# Patient Record
Sex: FEMALE | Race: WHITE | Employment: OTHER | URBAN - METROPOLITAN AREA
[De-identification: names, ages, dates, MRNs, and addresses within clinical notes are randomized per-mention and may not be internally consistent; named-entity substitution may affect disease eponyms.]

---

## 2017-09-15 ENCOUNTER — OFFICE VISIT (OUTPATIENT)
Dept: URGENT CARE | Facility: URGENT CARE | Age: 72
End: 2017-09-15
Payer: COMMERCIAL

## 2017-09-15 ENCOUNTER — HOSPITAL ENCOUNTER (OUTPATIENT)
Dept: CT IMAGING | Facility: CLINIC | Age: 72
Discharge: HOME | End: 2017-09-15
Attending: PHYSICIAN ASSISTANT | Admitting: PHYSICIAN ASSISTANT
Payer: MEDICARE

## 2017-09-15 VITALS — SYSTOLIC BLOOD PRESSURE: 156 MMHG | DIASTOLIC BLOOD PRESSURE: 84 MMHG | HEART RATE: 72 BPM | OXYGEN SATURATION: 94 %

## 2017-09-15 DIAGNOSIS — S09.90XA CLOSED HEAD INJURY, INITIAL ENCOUNTER: ICD-10-CM

## 2017-09-15 DIAGNOSIS — S00.03XA SCALP HEMATOMA, INITIAL ENCOUNTER: Primary | ICD-10-CM

## 2017-09-15 DIAGNOSIS — S60.312A: ICD-10-CM

## 2017-09-15 DIAGNOSIS — S00.03XA SCALP HEMATOMA, INITIAL ENCOUNTER: ICD-10-CM

## 2017-09-15 PROCEDURE — 70450 CT HEAD/BRAIN W/O DYE: CPT

## 2017-09-15 PROCEDURE — 99203 OFFICE O/P NEW LOW 30 MIN: CPT | Performed by: PHYSICIAN ASSISTANT

## 2017-09-15 RX ORDER — LEVOTHYROXINE SODIUM 100 UG/1
100 TABLET ORAL DAILY
COMMUNITY
End: 2020-11-10

## 2017-09-15 RX ORDER — SERTRALINE HYDROCHLORIDE 100 MG/1
200 TABLET, FILM COATED ORAL DAILY
COMMUNITY

## 2017-09-15 RX ORDER — BUPROPION HYDROCHLORIDE 150 MG/1
450 TABLET, EXTENDED RELEASE ORAL DAILY
COMMUNITY
End: 2020-11-10

## 2017-09-15 NOTE — NURSING NOTE
Chief Complaint   Patient presents with     Head Injury     fell and hit head on concrete aprox 1 hr ago,has swelling on scalp,pt denies LOC,denies headache or nausea       Initial /84 (BP Location: Right arm, Patient Position: Chair, Cuff Size: Adult Large)  Pulse 72  SpO2 94% There is no height or weight on file to calculate BMI.  Medication Reconciliation: complete Matti REYES

## 2017-09-15 NOTE — MR AVS SNAPSHOT
"              After Visit Summary   9/15/2017    Fern Simons    MRN: [id number]           Patient Information     Date Of Birth          1945        Visit Information        Provider Department      9/15/2017 3:15 PM Rolando James PA-C [facility]        Today's Diagnoses     Scalp hematoma, initial encounter    -  1    Closed head injury, initial encounter        Abrasion of thumb, left, initial encounter           Follow-ups after your visit        Who to contact     If you have questions or need follow up information about today's clinic visit or your schedule please contact [facility] directly at [phone].  Normal or non-critical lab and imaging results will be communicated to you by MyChart, letter or phone within 4 business days after the clinic has received the results. If you do not hear from us within 7 days, please contact the clinic through "[employer]hart or phone. If you have a critical or abnormal lab result, we will notify you by phone as soon as possible.  Submit refill requests through [employer] or call your pharmacy and they will forward the refill request to us. Please allow 3 business days for your refill to be completed.          Additional Information About Your Visit        MyChart Information     [employer] lets you send messages to your doctor, view your test results, renew your prescriptions, schedule appointments and more. To sign up, go to [email or web address] . Click on \"Log in\" on the left side of the screen, which will take you to the Welcome page. Then click on \"Sign up Now\" on the right side of the page.     You will be asked to enter the access code listed below, as well as some personal information. Please follow the directions to create your username and password.     Your access code is: [email or web address]  Expires: 2017 11:39 AM     Your access code will  in 90 days. If you need help or a new code, please " call your [facility] clinic or [phone].        Care EveryWhere ID     This is your Care EveryWhere ID. This could be used by other organizations to access your [facility] medical records  [id number]        Your Vitals Were     Pulse Pulse Oximetry                72 94%           Blood Pressure from Last 3 Encounters:   09/15/17 156/84    Weight from Last 3 Encounters:   No data found for Wt               Primary Care Provider    None Specified       No primary provider on file.        Equal Access to Services     ROBERTO GUSTAFSON : Hadii aad ku hadasho Soomaali, waaxda luqadaha, qaybta kaalmada adeegyada, celso aquino oneln genemayda osullivananahi zacarias . So [facility] [phone].    ATENCIÓN: Si habla español, tiene a miles disposición servicios gratuitos de asistencia lingüística. Llame al [phone].    We comply with applicable federal civil rights laws and [place] laws. We do not discriminate on the basis of race, color, national origin, age, disability sex, sexual orientation or gender identity.            Thank you!     Thank you for choosing [facility]  for your care. Our goal is always to provide you with excellent care. Hearing back from our patients is one way we can continue to improve our services. Please take a few minutes to complete the written survey that you may receive in the mail after your visit with us. Thank you!             Your Updated Medication List - Protect others around you: Learn how to safely use, store and throw away your medicines at www.disposemymeds.org.          This list is accurate as of: 9/15/17 11:59 PM.  Always use your most recent med list.                   Brand Name Dispense Instructions for use Diagnosis    SYNTHROID 100 MCG tablet   Generic drug:  levothyroxine      Take 100 mcg by mouth daily        WELLBUTRIN  MG 12 hr tablet   Generic drug:  buPROPion      Take 450 mg by mouth daily        ZOLOFT 100 MG tablet   Generic drug:  sertraline       Take 200 mg by mouth daily

## 2017-09-18 NOTE — PROGRESS NOTES
SUBJECTIVE:   Fern Simons is a 72 year old female presenting with a chief complaint of having a head injury, fall backwards and hit her head.  Onset of symptoms was today .  Course of illness is same.    Severity moderate  Current and Associated symptoms: head and scalp tenderness  Treatment measures tried include none.  Predisposing factors include .    No past medical history on file.     Allergies   Allergen Reactions     Ciprofloxacin      Sulfa Drugs          Social History   Substance Use Topics     Smoking status: Former Smoker     Quit date: 9/15/1980     Smokeless tobacco: Never Used     Alcohol use Not on file       ROS:  CONSTITUTIONAL:NEGATIVE for fever, chills, change in weight  INTEGUMENTARY/SKIN: POSITIVE for scalp tenderness posterior head  ENT/MOUTH: NEGATIVE for ear, mouth and throat problems  RESP:NEGATIVE for significant cough or SOB  CV: NEGATIVE for chest pain, palpitations or peripheral edema  GI: NEGATIVE for nausea, abdominal pain, heartburn, or change in bowel habits  MUSCULOSKELETAL: NEGATIVE for significant arthralgias or myalgia  NEURO: NEGATIVE for weakness, dizziness or paresthesias    OBJECTIVE  :/84 (BP Location: Right arm, Patient Position: Chair, Cuff Size: Adult Large)  Pulse 72  SpO2 94%  GENERAL APPEARANCE: healthy, alert and no distress  EYES: EOMI,  PERRL, conjunctiva clear  HENT: ear canals and TM's normal.  Nose and mouth without ulcers, erythema or lesions  NECK: supple, nontender, no lymphadenopathy  RESP: lungs clear to auscultation - no rales, rhonchi or wheezes  CV: regular rates and rhythm, normal S1 S2, no murmur noted  ABDOMEN:  soft, nontender, no HSM or masses and bowel sounds normal  NEURO: Normal strength and tone, sensory exam grossly normal,  normal speech and mentation  SKIN:Positive for mild swelling posterior scalp    CT OF THE HEAD WITHOUT CONTRAST  9/15/2017  6:00 PM      COMPARISON: None.     HISTORY: Head injury, scalp hematoma. Unspecified  injury of head,  initial encounter. Contusion of scalp, initial encounter.     TECHNIQUE: 5 mm thick axial CT images of the head were acquired  without IV contrast material.     FINDINGS: There is mild diffuse cerebral volume loss. There are subtle  patchy areas of decreased density in the cerebral white matter  bilaterally that are consistent with sequela of chronic small vessel  ischemic disease.     The ventricles and basal cisterns are within normal limits in  configuration given the degree of cerebral volume loss. There is no  midline shift. There are no extra-axial fluid collections.     No intracranial hemorrhage, mass or recent infarct.     The visualized paranasal sinuses are well-aerated. There is no  mastoiditis. There are no fractures of the visualized bones.         IMPRESSION: Diffuse cerebral volume loss and cerebral white matter  changes consistent with chronic small vessel ischemic disease. No  evidence for acute intracranial pathology.         ASSESSMENT/PLAN:      ICD-10-CM    1. Scalp hematoma, initial encounter S00.03XA CT Head w/o Contrast   2. Closed head injury, initial encounter S09.90XA CT Head w/o Contrast   3. Abrasion of thumb, left, initial encounter S60.312A        Cold packs, tylenol  If any head injury symptoms occur then go to the ED

## 2018-05-01 ENCOUNTER — APPOINTMENT (OUTPATIENT)
Dept: CT IMAGING | Facility: CLINIC | Age: 73
End: 2018-05-01
Attending: NURSE PRACTITIONER
Payer: MEDICARE

## 2018-05-01 ENCOUNTER — APPOINTMENT (OUTPATIENT)
Dept: GENERAL RADIOLOGY | Facility: CLINIC | Age: 73
End: 2018-05-01
Attending: NURSE PRACTITIONER
Payer: MEDICARE

## 2018-05-01 ENCOUNTER — HOSPITAL ENCOUNTER (EMERGENCY)
Facility: CLINIC | Age: 73
Discharge: HOME | End: 2018-05-01
Attending: NURSE PRACTITIONER | Admitting: NURSE PRACTITIONER
Payer: MEDICARE

## 2018-05-01 VITALS
DIASTOLIC BLOOD PRESSURE: 87 MMHG | RESPIRATION RATE: 18 BRPM | HEART RATE: 68 BPM | SYSTOLIC BLOOD PRESSURE: 168 MMHG | BODY MASS INDEX: 37.76 KG/M2 | HEIGHT: 61 IN | OXYGEN SATURATION: 96 % | TEMPERATURE: 98 F | WEIGHT: 200 LBS
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
## 2018-05-01 DIAGNOSIS — M25.552 HIP PAIN, LEFT: ICD-10-CM

## 2018-05-01 DIAGNOSIS — S09.90XA CLOSED HEAD INJURY, INITIAL ENCOUNTER: ICD-10-CM

## 2018-05-01 DIAGNOSIS — M25.562 ACUTE PAIN OF LEFT KNEE: ICD-10-CM

## 2018-05-01 PROCEDURE — 90714 TD VACC NO PRESV 7 YRS+ IM: CPT | Performed by: NURSE PRACTITIONER

## 2018-05-01 PROCEDURE — 90471 IMMUNIZATION ADMIN: CPT

## 2018-05-01 PROCEDURE — 73502 X-RAY EXAM HIP UNI 2-3 VIEWS: CPT

## 2018-05-01 PROCEDURE — 25000128 H RX IP 250 OP 636: Performed by: NURSE PRACTITIONER

## 2018-05-01 PROCEDURE — 70486 CT MAXILLOFACIAL W/O DYE: CPT

## 2018-05-01 PROCEDURE — 70450 CT HEAD/BRAIN W/O DYE: CPT

## 2018-05-01 PROCEDURE — 73562 X-RAY EXAM OF KNEE 3: CPT | Mod: LT

## 2018-05-01 PROCEDURE — 99285 EMERGENCY DEPT VISIT HI MDM: CPT | Mod: 25

## 2018-05-01 RX ADMIN — TETANUS AND DIPHTHERIA TOXOIDS ADSORBED 0.5 ML: 2; 2 INJECTION INTRAMUSCULAR at 17:08

## 2018-05-01 ASSESSMENT — ENCOUNTER SYMPTOMS
FEVER: 0
ABDOMINAL PAIN: 0
HEADACHES: 1

## 2018-05-01 NOTE — ED AVS SNAPSHOT
Emergency Department    [address] [place] [address]    Phone:  [phone]    Fax:  [phone]                                       Fern Simons   MRN: [id number]    Department:   Emergency Department   Date of Visit:  5/1/2018           After Visit Summary Signature Page     I have received my discharge instructions, and my questions have been answered. I have discussed any challenges I see with this plan with the nurse or doctor.    ..........................................................................................................................................  Patient/Patient Representative Signature      ..........................................................................................................................................  Patient Representative Print Name and Relationship to Patient    ..................................................               ................................................  Date                                            Time    ..........................................................................................................................................  Reviewed by Signature/Title    ...................................................              ..............................................  Date                                                            Time

## 2018-05-01 NOTE — ED PROVIDER NOTES
History     Chief Complaint:  Fall     The history is provided by the patient.      Fern Simons is a 73 year old female with a history of falls who presents to the emergency department today for evaluation of a fall. Last night, the patient was going to open her window when she tripped on the fan cord hooked into the wall. She fell over, hitting the right side of her face on a dresser and falling to the ground. The denies loss of consciousness, but reports that she has pain in her right leg, and worse pain in her left knee, hip, and ankle, although she has been able to walk. Overall, she has some all-over aches from the fall. She has no pain opening and closing her mouth (although she feels like she has somewhat of a deformity on the same side as the hit (R)), right hip pain, has had no newly developed confusion or arm pain, and has no abdominal pain. She had a headache earlier today, and after taking ibuprofen that has resolved. She is not anticoagulated.  She denies vision changes, double vision, neck pain.     Allergies:  Ciprofloxacin  Sulfa Drugs     Medications:    buPROPion (WELLBUTRIN SR) 150 MG 12 hr tablet  levothyroxine (SYNTHROID) 100 MCG tablet  sertraline (ZOLOFT) 100 MG tablet    Past Medical History:    History reviewed. No pertinent medical history.     Past Surgical History:    History reviewed. No pertinent surgical history.    Family History:    History reviewed. No pertinent family history.    Social History:  The patient was accompanied to the ED by her friend.  Smoking Status: Former Smoker  Smokeless Tobacco: Never Used   Marital Status:  Single     Review of Systems   Constitutional: Negative for fever.   Gastrointestinal: Negative for abdominal pain.   Musculoskeletal:        Small facial laceration  Hip pain (L)  Knee Pain (L)  Ankle Pain (L)   Neurological: Positive for headaches (resolved). Negative for syncope.   All other systems reviewed and are negative.     Physical Exam  "    Patient Vitals for the past 24 hrs:   BP Temp Temp src Pulse Heart Rate Resp SpO2 Height Weight   05/01/18 1739 168/87 - - 68 - - - - -   05/01/18 1547 (!) 186/94 98  F (36.7  C) Oral - 78 18 96 % 1.549 m (5' 1\") 90.7 kg (200 lb)      Physical Exam  Nursing notes reviewed. Vitals reviewed.   General: Alert. Well kept.   Eyes: Conjunctiva non-injected, non-icteric. EOMI intact and conjugate.  PERRLA. No racoon eyes.   Ears:TM s normal. No hemotympanum. No de la fuente signs.   Neck/Throat: Moist mucous membranes, oropharynx clear without erythema or exudate. No cervical lymphadenopathy. Normal voice. No trismus. No intraoral wounds.  Cardiac: Regular rhythm. Normal heart sounds with no murmur/rubs/click.   Pulmonary: Clear and equal breath sounds bilaterally. No crackles/rales. No wheezing.   Abdomen: Soft. Non-distended. Non-tender to palpation. No masses. No guarding or rebound.  Musculoskeletal: No midline cervical, thoracic, lumbar pain. Tenderness to palpation and with ROM to left hip and posterior left knee.  No knee joint line pain.  No pain with ROM of ankle or foot. Active flexion and extension of left knee. Superficial abrasion right knee with no pain with ROM of right knee or hip or with varus/valgus stressing.   Neurological: Alert and oriented x4.   Skin: Warm and dry without rashes or petechiae. 1cm abrasion right lower jaw. Bruising over right cheek  Psych: Affect normal. Good eye contact.    Emergency Department Course     Imaging:  Radiology findings were communicated with the patient who voiced understanding of the findings.     Maxillofacial  CT w/o contrast  Preliminary   No evidence of facial bone fracture.  Reading per radiology     Head CT w/o contrast  1. No evidence of acute intracranial hemorrhage, mass, or herniation.  2. Mild diffuse parenchymal volume loss and white matter changes  likely due to chronic microvascular ischemic disease.  Reading per radiology      XR Knee Left 3 Views  No " acute fracture or dislocation. Small joint effusion.  Mild osteoarthritis.  Reading per radiology     XR Pelvis w Hip Left 1 View  No acute fracture or dislocation.  Reading per radiology     Interventions:  1708              Tetanus 0.5 mL IM    Emergency department Course     1547              Nursing notes and vitals reviewed.     1550              I performed an exam of the patient as documented above.      1606              The patient was sent for a CT and XR while in the emergency department, results above.       1617              The patient was sent for a XR while in the emergency department, results above.       1730              I personally reviewed the imaging results with the patient and answered all related questions prior to discharge.    Impression & Plan      Medical Decision Making:  Fern Simons is a 73 year old female who presents to the emergency department today for evaluation of left knee, hip, and ankle pain, as well as trauma to the right face after falling. Upon exam she has tenderness to palpation along the posterior left knee with ROM, and left hip pain with ROM. She has been ambulatory although notes pain worsening today over yesterday. She also has a small abrasion over the right lower jaw and tenderness with early bruising along the right cheek. She has no intra-oral lacerations. She did not sustain loss of consciousness and is non-anticoagulated. XR of the left hip and pelvis, and left knee were negative for acute fracture. Head CT was also negative for any intra-cerebral bleed, calvarial fracture, or mass. Her maxillofacial CT also shows no acute fracture. Neck cleared clinically using NEXUS criteria. She is appropriate for outpatient follow up, and her symptoms are most likely due to musculoskeletal strain. She was instructed on use of OTC medications for discomfort and icing the areas of pain. She will follow up with primary care for recheck in 2-3 days.    Diagnosis:     ICD-10-CM    1. Fall, initial encounter W19.XXXA    2. Acute pain of left knee M25.562    3. Hip pain, left M25.552    4. Closed head injury, initial encounter S09.90XA      Disposition:   Discharge    Scribe Disclosure:  I, Dominguez Núñez, am serving as a scribe at 3:44 PM on 5/1/2018 to document services personally performed by Carleen Massey CNP based on my observations and the provider's statements to me.      EMERGENCY DEPARTMENT       Carleen Massey CNP  05/01/18 2216       Carleen Massey CNP  05/01/18 2213

## 2018-05-01 NOTE — ED AVS SNAPSHOT
Emergency Department    [address] [facility] [address]    Phone:  [phone]    Fax:  [phone]                                       Fern Simosn   MRN: [id number]    Department:   Emergency Department   Date of Visit:  5/1/2018           Patient Information     Date Of Birth          1945        Your diagnoses for this visit were:     Fall, initial encounter     Acute pain of left knee     Hip pain, left     Closed head injury, initial encounter        You were seen by Carleen Massey CNP.      Follow-up Information     Follow up with Roberto Acevedo MD In 2 days.    Specialty:  Internal Medicine    Why:  for recheck or with your primary care    Contact information:    [address] [facility][1] [address] [phone]          Follow up with  Emergency Department.    Specialty:  EMERGENCY MEDICINE    Why:  As needed, If symptoms worsen    Contact information:    [address] [facility] [address] [phone]        Discharge Instructions       Discharge Instructions  Head Injury    You have been seen today for a head injury. Your evaluation included a history and physical examination. You may have had a CT (CAT) scan performed, though most head injuries do not require a scan. Based on this evaluation, your provider today does not feel that your head injury is serious.    Generally, every Emergency Department visit should have a follow-up clinic visit with either a primary or a specialty clinic/provider. Please follow-up as instructed by your emergency provider today.  Return to the Emergency Department if:    You are confused or you are not acting right.    Your headache gets worse or you start to have a really bad headache even with your recommended treatment plan.    You vomit (throw up) more than once.    You have a seizure.    You have trouble walking.    You have weakness or paralysis (cannot move) in an arm or a leg.    You have blood or fluid  coming from your ears or nose.    You have new symptoms or anything that worries you.    Sleeping:  It is okay for you to sleep, but someone should wake you up if instructed by your provider, and someone should check on you at your usual time to wake up.     Activity:    Do not drive for at least 24 hours.    Do not drive if you have dizzy spells or trouble concentrating, or remembering things.    Do not return to any contact sports until cleared by your regular provider.     MORE INFORMATION:    Concussion:  A concussion is a minor head injury that may cause temporary problems with the way the brain works. Although concussions are important, they are generally not an emergency or a reason that a person needs to be hospitalized. Some concussion symptoms include confusion, amnesia (forgetful), nausea (sick to your stomach) and vomiting (throwing up), dizziness, fatigue, memory or concentration problems, irritability and sleep problems. For most people, concussions are mild and temporary but some will have more severe and persistent symptoms that require on-going care and treatment.  CT Scans: Your evaluation today may have included a CT scan (CAT scan) to look for things like bleeding or a skull fracture (broken bone).  CT scans involve radiation and too many CT scans can cause serious health problems like cancer, especially in children.  Because of this, your provider may not have ordered a CT scan today if they think you are at low risk for a serious or life threatening problem.    If you were given a prescription for medicine here today, be sure to read all of the information (including the package insert) that comes with your prescription.  This will include important information about the medicine, its side effects, and any warnings that you need to know about.  The pharmacist who fills the prescription can provide more information and answer questions you may have about the medicine.  If you have questions or  concerns that the pharmacist cannot address, please call or return to the Emergency Department.     Remember that you can always come back to the Emergency Department if you are not able to see your regular provider in the amount of time listed above, if you get any new symptoms, or if there is anything that worries you.        Knee Pain  Knee pain is very common. It s especially common in active people who put a lot of pressure on their knees, like runners. It affects women more often than men.  Your kneecap (patella) is a thick, round bone. It covers and protects the front portion of your knee joint. It moves along a groove in your thighbone (femur) as part of the patellofemoral joint. A layer of cartilage surrounds the underside of your kneecap. This layer protects it from grinding against your femur.  When this cartilage softens and breaks down, it can cause knee pain. This is partly because of repetitive stress. The stress irritates the lining of the joint. This causes pain in the underlying bone.  What causes knee pain?  Many things can cause knee pain. You may have more than one cause. Some of these include:    Overuse of the knee joint    The kneecap doesn t line up with the tissue around it    Damage to small nerves in the area    Damage to the ligament-like structure that holds the kneecap in place (retinaculum)    Breakdown of the bone under the cartilage    Swelling in the soft tissues around the kneecap    Injury  You might be more likely to have knee pain if you:    Exercise a lot    Recently increased the intensity of your workouts    Have a body mass index (BMI) greater than 25    Have poor alignment of your kneecap    Walk with your feet turned overly outward or inward    Have weakness in surrounding muscle groups (inner quad or hip adductor muscles)    Have too much tightness in surrounding muscle groups (hamstrings or iliotibial band)    Have a recent history of injury to the area    Are  female  Symptoms of knee pain  This type of knee pain is a dull, aching pain in the front of the knee in the area under and around the kneecap. This pain may start quickly or slowly. Your pain might be worse when you squat, run, or sit for a long time. You might also sometimes feel like your knee is giving out. You may have symptoms in one or both of your knees.  Diagnosing knee pain  Your healthcare provider will ask about your medical history and your symptoms. Be sure to describe any activities that make your knee pain worse. He or she will look at your knee. This will include tests of your range of motion, strength, and areas of pain of your knee. Your knee alignment will be checked.  Your healthcare provider will need to rule out other causes of your knee pain, such as arthritis. You may need an imaging test, such as an X-ray or MRI.  Treatment for knee pain  Treatments that can help ease your symptoms may include:    Avoiding activities for a while that make your pain worse, returning to activity over time    Icing the outside of your knee when it causes you pain    Taking over-the-counter pain medicine    Wearing a knee brace or taping your knee to support it    Wearing special shoe inserts to help keep your feet in the proper alignment    Doing special exercises to stretch and strengthen the muscles around your hip and your knee  These steps help most people manage knee pain. But some cases of knee pain need to be treated with surgery. You may need surgery right away. Or you may need it later if other treatments don t work. Your healthcare provider may refer you to an orthopedic surgeon. He or she will talk with you about your choices.  Preventing knee pain  Losing weight and correcting excess muscle tightness or muscle weakness may help lower your risk.  In some cases, you can prevent knee pain. To help prevent a flare-up of knee pain, you do these things:    Regularly do all the exercises your doctor or  physical therapist advises    Support your knee as advised by your doctor or physical therapist    Increase training gradually, and ease up on training when needed    Have an expert check your gait for running or other sporting activities    Stretch properly before and after exercise    Replace your running shoes regularly    Lose excess weight     When to call your healthcare provider  Call your healthcare provider right away if:    Your symptoms don t get better after a few weeks of treatment    You have any new symptoms   Date Last Reviewed: 4/1/2017 2000-2017 [employer]. [address] [place], [place] [address]. All rights reserved. This information is not intended as a substitute for professional medical care. Always follow your healthcare professional's instructions.          Hip Contusion    A contusion is another word for a bruise. It happens when small blood vessels break open and leak blood into the nearby area. A hip contusion can result from a bump, hit, or fall. Symptoms of a contusion often include changes in skin color (bruising), swelling, and pain. It may take several hours for a deep bruise to show up. If the injury is severe, you may need an X-ray to check for broken bones. Swelling should decrease in a few days. Bruising and pain may take several weeks to go away.  Home care    Unless another medicine was prescribed, you may take acetaminophen, ibuprofen, or naproxen to help relieve pain and swelling. If needed, stronger pain medicines may be prescribed. Take all medicines exactly as directed.    Ice the bruised area to help reduce pain and swelling. Wrap a cold source (ice pack or ice cubes in a plastic bag) in a thin towel. Apply the cold source to the bruised area for 20 minutes every 1 to 2 hours the first day. Continue this 3 to 4 times a day until the pain and swelling goes away.    If walking causes pain, use crutches or a walker until you can walk without pain. These  items can be rented at most pharmacies and orthopedic supply stores.    If your injury is keeping you from moving around or caring for yourself properly, you may qualify for services such as home healthcare. Check with your doctor and insurance company to see if this type of care is covered.  Follow-up  Follow up with your healthcare provider, or as advised.  When to seek medical advice   Call your healthcare provider right away if any of these occur:    Increased pain, bruising, or swelling near the injured area    Decreased ability to bear weight on the injured side    Pain or swelling develops below the knee    Chest pain or shortness of breath  Date Last Reviewed: 4/1/2017 2000-2017 [employer]. [address] [place] [address], [place] [address]. All rights reserved. This information is not intended as a substitute for professional medical care. Always follow your healthcare professional's instructions.          24 Hour Appointment Hotline       To make an appointment at any [facility], call [phone] ([phone]). If you don't have a family doctor or clinic, we will help you find one. [facility] clinics are conveniently located to serve the needs of you and your family.             Review of your medicines      Our records show that you are taking the medicines listed below. If these are incorrect, please call your family doctor or clinic.        Dose / Directions Last dose taken    SYNTHROID 100 MCG tablet   Dose:  100 mcg   Generic drug:  levothyroxine        Take 100 mcg by mouth daily   Refills:  0        WELLBUTRIN  MG 12 hr tablet   Dose:  450 mg   Generic drug:  buPROPion        Take 450 mg by mouth daily   Refills:  0        ZOLOFT 100 MG tablet   Dose:  200 mg   Generic drug:  sertraline        Take 200 mg by mouth daily   Refills:  0                Procedures and tests performed during your visit     Head CT w/o contrast    Maxillofacial  CT w/o contrast    XR Knee Left 3  Views    XR Pelvis w Hip Left 1 View      Orders Needing Specimen Collection     None      Pending Results     Date and Time Order Name Status Description    5/1/2018 1606 XR Pelvis w Hip Left 1 View Preliminary     5/1/2018 1606 XR Knee Left 3 Views Preliminary     5/1/2018 1606 Head CT w/o contrast Preliminary     5/1/2018 1606 Maxillofacial  CT w/o contrast Preliminary             Pending Culture Results     No orders found from 4/29/2018 to 5/2/2018.            Pending Results Instructions     If you had any lab results that were not finalized at the time of your Discharge, you can call the ED Lab Result RN at [phone]. You will be contacted by this team for any positive Lab results or changes in treatment. The nurses are available 7 days a week from 10A to 6:30P.  You can leave a message 24 hours per day and they will return your call.        Test Results From Your Hospital Stay        5/1/2018  5:33 PM      Narrative     CT SCAN OF THE FACE WITHOUT CONTRAST 5/1/2018 4:54 PM     HISTORY: Left cheek pain after fall.     TECHNIQUE: Axial CT images of the facial bones were completed with  sagittal and coronal reformations. Radiation dose for this scan was  reduced using automated exposure control, adjustment of the mA and/or  kV according to patient size, or iterative reconstruction technique.     COMPARISON: None.    FINDINGS:  No significant soft tissue swelling. No facial bone  fracture identified. The temporomandibular joints appear located.     The visualized intracranial structures are unremarkable. No mass  identified within the visualized soft tissues of the neck. Mild  mucosal thickening along the inferior maxillary sinuses. Degenerative  changes in the visualized cervical spine.        Impression     IMPRESSION: No evidence of facial bone fracture.         5/1/2018  5:01 PM      Narrative     CT SCAN OF THE HEAD WITHOUT CONTRAST   5/1/2018 4:54 PM     HISTORY: Fell and struck head.    TECHNIQUE:  Axial images of the head and coronal reformations without  IV contrast material. Radiation dose for this scan was reduced using  automated exposure control, adjustment of the mA and/or kV according  to patient size, or iterative reconstruction technique.    COMPARISON: Head CT 9/15/2017.    FINDINGS: There is no evidence of intracranial hemorrhage, mass, acute  infarct or anomaly. The ventricles are normal in size, shape and  configuration. Mild diffuse parenchymal volume loss. Mild patchy  periventricular white matter hypodensities which are nonspecific, but  likely related to chronic microvascular ischemic disease.     The visualized portions of the sinuses and mastoids appear normal. No  calvarial fracture appreciated. Please see dedicated facial bone CT  for details of the facial bones.        Impression     IMPRESSION:     1. No evidence of acute intracranial hemorrhage, mass, or herniation.  2. Mild diffuse parenchymal volume loss and white matter changes  likely due to chronic microvascular ischemic disease.           5/1/2018  4:46 PM      Narrative     LEFT KNEE THREE VIEWS   5/1/2018 4:42 PM     HISTORY: Posterior knee pain after fall.    COMPARISON: None.         Impression     IMPRESSION: No acute fracture or dislocation. Small joint effusion.  Mild osteoarthritis.         5/1/2018  4:44 PM      Narrative     PELVIS AND HIP LEFT ONE VIEW   5/1/2018 4:41 PM     HISTORY: Left hip pain after fall.     COMPARISON: None.         Impression     IMPRESSION: No acute fracture or dislocation.                Clinical Quality Measure: Blood Pressure Screening     Your blood pressure was checked while you were in the emergency department today. The last reading we obtained was  BP: (!) 186/94 . Please read the guidelines below about what these numbers mean and what you should do about them.  If your systolic blood pressure (the top number) is less than 120 and your diastolic blood pressure (the bottom number) is  "less than 80, then your blood pressure is normal. There is nothing more that you need to do about it.  If your systolic blood pressure (the top number) is 120-139 or your diastolic blood pressure (the bottom number) is 80-89, your blood pressure may be higher than it should be. You should have your blood pressure rechecked within a year by a primary care provider.  If your systolic blood pressure (the top number) is 140 or greater or your diastolic blood pressure (the bottom number) is 90 or greater, you may have high blood pressure. High blood pressure is treatable, but if left untreated over time it can put you at risk for heart attack, stroke, or kidney failure. You should have your blood pressure rechecked by a primary care provider within the next 4 weeks.  If your provider in the emergency department today gave you specific instructions to follow-up with your doctor or provider even sooner than that, you should follow that instruction and not wait for up to 4 weeks for your follow-up visit.        Thank you for choosing [facility]       Thank you for choosing [facility] for your care. Our goal is always to provide you with excellent care. Hearing back from our patients is one way we can continue to improve our services. Please take a few minutes to complete the written survey that you may receive in the mail after you visit with us. Thank you!        [employer] Information     [employer] lets you send messages to your doctor, view your test results, renew your prescriptions, schedule appointments and more. To sign up, go to [email or web address] . Click on \"Log in\" on the left side of the screen, which will take you to the Welcome page. Then click on \"Sign up Now\" on the right side of the page.     You will be asked to enter the access code listed below, as well as some personal information. Please follow the directions to create your username and password.     Your access code is: [email or web address]  Expires: 7/30/2018  " 5:34 PM     Your access code will  in 90 days. If you need help or a new code, please call your [facility] clinic or [phone].        Care EveryWhere ID     This is your Care EveryWhere ID. This could be used by other organizations to access your [facility] medical records  [id number]        Equal Access to Services     ROBERTO GUSTAFSON : Dulce Sims, chris rajan, lamont barbosa, celso zacarias . So [facility] [phone].    ATENCIÓN: Si habla español, tiene a miles disposición servicios gratuitos de asistencia lingüística. Llame al [phone].    We comply with applicable federal civil rights laws and [place] laws. We do not discriminate on the basis of race, color, national origin, age, disability, sex, sexual orientation, or gender identity.            After Visit Summary       This is your record. Keep this with you and show to your community pharmacist(s) and doctor(s) at your next visit.

## 2018-05-01 NOTE — DISCHARGE INSTRUCTIONS
Discharge Instructions  Head Injury    You have been seen today for a head injury. Your evaluation included a history and physical examination. You may have had a CT (CAT) scan performed, though most head injuries do not require a scan. Based on this evaluation, your provider today does not feel that your head injury is serious.    Generally, every Emergency Department visit should have a follow-up clinic visit with either a primary or a specialty clinic/provider. Please follow-up as instructed by your emergency provider today.  Return to the Emergency Department if:    You are confused or you are not acting right.    Your headache gets worse or you start to have a really bad headache even with your recommended treatment plan.    You vomit (throw up) more than once.    You have a seizure.    You have trouble walking.    You have weakness or paralysis (cannot move) in an arm or a leg.    You have blood or fluid coming from your ears or nose.    You have new symptoms or anything that worries you.    Sleeping:  It is okay for you to sleep, but someone should wake you up if instructed by your provider, and someone should check on you at your usual time to wake up.     Activity:    Do not drive for at least 24 hours.    Do not drive if you have dizzy spells or trouble concentrating, or remembering things.    Do not return to any contact sports until cleared by your regular provider.     MORE INFORMATION:    Concussion:  A concussion is a minor head injury that may cause temporary problems with the way the brain works. Although concussions are important, they are generally not an emergency or a reason that a person needs to be hospitalized. Some concussion symptoms include confusion, amnesia (forgetful), nausea (sick to your stomach) and vomiting (throwing up), dizziness, fatigue, memory or concentration problems, irritability and sleep problems. For most people, concussions are mild and temporary but some will have more  severe and persistent symptoms that require on-going care and treatment.  CT Scans: Your evaluation today may have included a CT scan (CAT scan) to look for things like bleeding or a skull fracture (broken bone).  CT scans involve radiation and too many CT scans can cause serious health problems like cancer, especially in children.  Because of this, your provider may not have ordered a CT scan today if they think you are at low risk for a serious or life threatening problem.    If you were given a prescription for medicine here today, be sure to read all of the information (including the package insert) that comes with your prescription.  This will include important information about the medicine, its side effects, and any warnings that you need to know about.  The pharmacist who fills the prescription can provide more information and answer questions you may have about the medicine.  If you have questions or concerns that the pharmacist cannot address, please call or return to the Emergency Department.     Remember that you can always come back to the Emergency Department if you are not able to see your regular provider in the amount of time listed above, if you get any new symptoms, or if there is anything that worries you.        Knee Pain  Knee pain is very common. It s especially common in active people who put a lot of pressure on their knees, like runners. It affects women more often than men.  Your kneecap (patella) is a thick, round bone. It covers and protects the front portion of your knee joint. It moves along a groove in your thighbone (femur) as part of the patellofemoral joint. A layer of cartilage surrounds the underside of your kneecap. This layer protects it from grinding against your femur.  When this cartilage softens and breaks down, it can cause knee pain. This is partly because of repetitive stress. The stress irritates the lining of the joint. This causes pain in the underlying bone.  What  causes knee pain?  Many things can cause knee pain. You may have more than one cause. Some of these include:    Overuse of the knee joint    The kneecap doesn t line up with the tissue around it    Damage to small nerves in the area    Damage to the ligament-like structure that holds the kneecap in place (retinaculum)    Breakdown of the bone under the cartilage    Swelling in the soft tissues around the kneecap    Injury  You might be more likely to have knee pain if you:    Exercise a lot    Recently increased the intensity of your workouts    Have a body mass index (BMI) greater than 25    Have poor alignment of your kneecap    Walk with your feet turned overly outward or inward    Have weakness in surrounding muscle groups (inner quad or hip adductor muscles)    Have too much tightness in surrounding muscle groups (hamstrings or iliotibial band)    Have a recent history of injury to the area    Are female  Symptoms of knee pain  This type of knee pain is a dull, aching pain in the front of the knee in the area under and around the kneecap. This pain may start quickly or slowly. Your pain might be worse when you squat, run, or sit for a long time. You might also sometimes feel like your knee is giving out. You may have symptoms in one or both of your knees.  Diagnosing knee pain  Your healthcare provider will ask about your medical history and your symptoms. Be sure to describe any activities that make your knee pain worse. He or she will look at your knee. This will include tests of your range of motion, strength, and areas of pain of your knee. Your knee alignment will be checked.  Your healthcare provider will need to rule out other causes of your knee pain, such as arthritis. You may need an imaging test, such as an X-ray or MRI.  Treatment for knee pain  Treatments that can help ease your symptoms may include:    Avoiding activities for a while that make your pain worse, returning to activity over  time    Icing the outside of your knee when it causes you pain    Taking over-the-counter pain medicine    Wearing a knee brace or taping your knee to support it    Wearing special shoe inserts to help keep your feet in the proper alignment    Doing special exercises to stretch and strengthen the muscles around your hip and your knee  These steps help most people manage knee pain. But some cases of knee pain need to be treated with surgery. You may need surgery right away. Or you may need it later if other treatments don t work. Your healthcare provider may refer you to an orthopedic surgeon. He or she will talk with you about your choices.  Preventing knee pain  Losing weight and correcting excess muscle tightness or muscle weakness may help lower your risk.  In some cases, you can prevent knee pain. To help prevent a flare-up of knee pain, you do these things:    Regularly do all the exercises your doctor or physical therapist advises    Support your knee as advised by your doctor or physical therapist    Increase training gradually, and ease up on training when needed    Have an expert check your gait for running or other sporting activities    Stretch properly before and after exercise    Replace your running shoes regularly    Lose excess weight     When to call your healthcare provider  Call your healthcare provider right away if:    Your symptoms don t get better after a few weeks of treatment    You have any new symptoms   Date Last Reviewed: 4/1/2017 2000-2017 [employer]. [address] [place] [address]. All rights reserved. This information is not intended as a substitute for professional medical care. Always follow your healthcare professional's instructions.          Hip Contusion    A contusion is another word for a bruise. It happens when small blood vessels break open and leak blood into the nearby area. A hip contusion can result from a bump, hit, or fall. Symptoms of a  contusion often include changes in skin color (bruising), swelling, and pain. It may take several hours for a deep bruise to show up. If the injury is severe, you may need an X-ray to check for broken bones. Swelling should decrease in a few days. Bruising and pain may take several weeks to go away.  Home care    Unless another medicine was prescribed, you may take acetaminophen, ibuprofen, or naproxen to help relieve pain and swelling. If needed, stronger pain medicines may be prescribed. Take all medicines exactly as directed.    Ice the bruised area to help reduce pain and swelling. Wrap a cold source (ice pack or ice cubes in a plastic bag) in a thin towel. Apply the cold source to the bruised area for 20 minutes every 1 to 2 hours the first day. Continue this 3 to 4 times a day until the pain and swelling goes away.    If walking causes pain, use crutches or a walker until you can walk without pain. These items can be rented at most pharmacies and orthopedic supply stores.    If your injury is keeping you from moving around or caring for yourself properly, you may qualify for services such as home healthcare. Check with your doctor and insurance company to see if this type of care is covered.  Follow-up  Follow up with your healthcare provider, or as advised.  When to seek medical advice   Call your healthcare provider right away if any of these occur:    Increased pain, bruising, or swelling near the injured area    Decreased ability to bear weight on the injured side    Pain or swelling develops below the knee    Chest pain or shortness of breath  Date Last Reviewed: 4/1/2017 2000-2017 [employer]. [address] [place] [address], [place] [address]. All rights reserved. This information is not intended as a substitute for professional medical care. Always follow your healthcare professional's instructions.

## 2018-05-01 NOTE — ED NOTES
Bed: FT01  Expected date:   Expected time:   Means of arrival:   Comments:  Triage: Kimmie Kimball, RN  05/01/18 154

## 2019-01-18 ENCOUNTER — APPOINTMENT (OUTPATIENT)
Dept: GENERAL RADIOLOGY | Facility: CLINIC | Age: 74
End: 2019-01-18
Payer: COMMERCIAL

## 2019-01-18 ENCOUNTER — HOSPITAL ENCOUNTER (EMERGENCY)
Facility: CLINIC | Age: 74
Discharge: HOME | End: 2019-01-18
Attending: EMERGENCY MEDICINE | Admitting: EMERGENCY MEDICINE
Payer: COMMERCIAL

## 2019-01-18 VITALS
SYSTOLIC BLOOD PRESSURE: 121 MMHG | DIASTOLIC BLOOD PRESSURE: 88 MMHG | BODY MASS INDEX: 41.54 KG/M2 | OXYGEN SATURATION: 98 % | HEIGHT: 61 IN | WEIGHT: 220 LBS | TEMPERATURE: 97.6 F | RESPIRATION RATE: 16 BRPM

## 2019-01-18 DIAGNOSIS — M17.11 PRIMARY OSTEOARTHRITIS OF RIGHT KNEE: ICD-10-CM

## 2019-01-18 DIAGNOSIS — M79.661 PAIN OF RIGHT LOWER LEG: ICD-10-CM

## 2019-01-18 PROCEDURE — 25000132 ZZH RX MED GY IP 250 OP 250 PS 637: Performed by: EMERGENCY MEDICINE

## 2019-01-18 PROCEDURE — 99284 EMERGENCY DEPT VISIT MOD MDM: CPT

## 2019-01-18 PROCEDURE — 73562 X-RAY EXAM OF KNEE 3: CPT | Mod: RT

## 2019-01-18 PROCEDURE — A9270 NON-COVERED ITEM OR SERVICE: HCPCS | Mod: GY | Performed by: EMERGENCY MEDICINE

## 2019-01-18 PROCEDURE — 73502 X-RAY EXAM HIP UNI 2-3 VIEWS: CPT

## 2019-01-18 RX ORDER — ACETAMINOPHEN 500 MG
1000 TABLET ORAL ONCE
Status: COMPLETED | OUTPATIENT
Start: 2019-01-18 | End: 2019-01-18

## 2019-01-18 RX ADMIN — ACETAMINOPHEN 1000 MG: 500 TABLET, FILM COATED ORAL at 13:30

## 2019-01-18 ASSESSMENT — ENCOUNTER SYMPTOMS
FATIGUE: 0
SHORTNESS OF BREATH: 0
BACK PAIN: 0
WOUND: 0
FEVER: 0
ABDOMINAL PAIN: 0
MYALGIAS: 0
ARTHRALGIAS: 0
CHILLS: 0
COUGH: 0

## 2019-01-18 ASSESSMENT — MIFFLIN-ST. JEOR: SCORE: 1435.29

## 2019-01-18 NOTE — ED PROVIDER NOTES
History     Chief Complaint:  Leg Pain    HPI   Fern Simons is a 73 year old female who presents to the emergency department today for evaluation of leg pain. The patient states that she has a history of falls, and in the past few days she has been experiencing increased bilateral leg pain, more so on the right. She most recently fell 10 days ago after slipping on ice. She can lift her leg without issue, but lifting and bending the knee causes severe pain to her  Thigh and knee. She denies that she had any serious injuries from her falls. She has been ambulatory. She notes that the pain occurs at its strongest when she has been sitting in one position and then tries to move the legs or get up. She has bee taking ibuprofen, last at 1000 today. The pain on her right extends from her hip down past her knee.   She denies any significant lower extremity swelling or edema. No low back pain. Denies numbness, tingling, or weakness     Allergies:  Ciprofloxacin  Sulfa Drugs    Medications:    MOTRIN PO  buPROPion (WELLBUTRIN SR) 150 MG 12 hr tablet  levothyroxine (SYNTHROID) 100 MCG tablet  sertraline (ZOLOFT) 100 MG tablet    Past Medical History:    Hypertension     Past Surgical History:    History reviewed. No pertinent surgical history.    Family History:    History reviewed. No pertinent family history.    Social History:  The patient was accompanied to the ED by her friend.  Smoking Status: Former Smoker  Smokeless Tobacco: Never Used  Alcohol Use: Negative     Marital Status:  Single      Review of Systems   Constitutional: Negative for chills, fatigue and fever.   Respiratory: Negative for cough and shortness of breath.    Cardiovascular: Positive for leg swelling.   Gastrointestinal: Negative for abdominal pain.   Genitourinary: Negative for pelvic pain.   Musculoskeletal: Negative for arthralgias, back pain and myalgias.        Leg pain.    Skin: Negative for rash and wound.   All other systems reviewed  "and are negative.    Physical Exam     Patient Vitals for the past 24 hrs:   BP Temp Temp src Heart Rate Resp SpO2 Height Weight   01/18/19 1258 121/88 97.6  F (36.4  C) Oral 96 16 98 % 1.549 m (5' 1\") 99.8 kg (220 lb)      Physical Exam  General: Well appearing, nontoxic.  Resting comfortably  Head:  Scalp, face, and head appear normal, atraumatic   Eyes:  Pupils are equal, round    Conjunctivae non-injected and sclerae white  ENT:    The external nose is normal    Pinnae are normal  Neck:  Normal range of motion    There is no rigidity noted    Trachea is in the midline  CV:  Regular rate and rhythm     Normal S1/S2, no S3/S4    No murmur or rub. DP pulses 2+ and intact bilaterally.   Resp:  Lungs are clear and equal bilaterally    There is no tachypnea    No increased work of breathing    No rales, wheezing, or rhonchi  GI:  Abdomen is soft, obese, no rigidity or guarding    No distension, or mass    No tenderness or rebound tenderness   MS:  Normal muscular tone. Bilateral lower extremities atraumatic and non tender to palpation. Pain with flexion of the right hip and knee, but otherwise full ROM. Mild right knee effusion present. Left lower extremity normal, full ROM. No calf swelling or tenderness.     Symmetric motor strength    No lower extremity edema  Skin:  No rash or acute skin lesions noted  Neuro: Awake and alert    Speech is normal and fluent    Moves all extremities spontaneously  Psych:  Normal affect.  Appropriate interactions.     Emergency Department Course     Imaging:  Radiology findings were communicated with the patient who voiced understanding of the findings.    XR Knee Right 3 Views   Suboptimal frontal projection. No convincing acute  fracture is seen. Mild knee DJD with small osteophytes identified.  Reading per radiology     XR Pelvis w Hip Right 1 View  No acute fracture or dislocation is identified. The  crosstable lateral views of the right hip are limited related to " "body  Habitus.  Reading per radiology    Interventions:  1330 Tylenol 1000 mg PO    Emergency Department Course:    1310 Nursing notes and vitals reviewed.    1315 I performed an exam of the patient as documented above.     1333 The patient was sent for a XR while in the emergency department, results above.      1440 I personally reviewed the imaging results with the patient and answered all related questions prior to discharge.    Impression & Plan      Medical Decision Making:  Fern Simons is a 73 year old female who presents for ongoing bilateral but right > left lower extremity pain resulting in her legs \"giving out\" and causing her to fall. She has full passive ROM. Pain increases with active ROM. No erythema or evidence of soft tissue infection. There are no signs of fracture on knee/hip xray.  There are no signs of a septic joint or bursitis. The patients neurovascular status is normal.  I suspect based on exam osteoarthritis of the right knee, she has a mild knee effusion. Plan is for protected weightbearing, ACE wrap applied in the ED. I recommended follow up with orthopedics, and consideration for physical therapy. I recommended Tylenol and NSAIDS prn for pain. Return precautions were discussed with patient. The patient's questions were answered and the patient was agreeable with discharge.     Diagnosis:    ICD-10-CM    1. Pain of right lower leg M79.661    2. Primary osteoarthritis of right knee M17.11      Disposition:   Discharge    Discharge Medications:START taking      Dose / Directions   acetaminophen 500 MG Caps      Dose:  2 capsule  Take 2 capsules by mouth every 8 hours as needed For aches, pain, fever  Quantity:  60 capsule  Refills:  0       Scribe Disclosure:  IDominguez, am serving as a scribe at 1:17 PM on 1/18/2019 to document services personally performed by Armand Rendon MD based on my observations and the provider's statements to me.    [facility]     "   Armand Rendon MD  01/18/19 2045

## 2019-01-18 NOTE — ED AVS SNAPSHOT
Emergency Department  [address] [place] [address]  Phone:  [phone]  Fax:  [phone]                                    Fern Simons   MRN: [id number]    Department:   Emergency Department   Date of Visit:  1/18/2019           After Visit Summary Signature Page    I have received my discharge instructions, and my questions have been answered. I have discussed any challenges I see with this plan with the nurse or doctor.    ..........................................................................................................................................  Patient/Patient Representative Signature      ..........................................................................................................................................  Patient Representative Print Name and Relationship to Patient    ..................................................               ................................................  Date                                   Time    ..........................................................................................................................................  Reviewed by Signature/Title    ...................................................              ..............................................  Date                                               Time          22EPIC Rev 08/18

## 2019-12-18 ENCOUNTER — MEDICAL CORRESPONDENCE (OUTPATIENT)
Dept: HEALTH INFORMATION MANAGEMENT | Facility: CLINIC | Age: 74
End: 2019-12-18

## 2019-12-28 ENCOUNTER — APPOINTMENT (OUTPATIENT)
Dept: CT IMAGING | Facility: CLINIC | Age: 74
End: 2019-12-28
Attending: EMERGENCY MEDICINE
Payer: MEDICARE

## 2019-12-28 ENCOUNTER — APPOINTMENT (OUTPATIENT)
Dept: GENERAL RADIOLOGY | Facility: CLINIC | Age: 74
End: 2019-12-28
Attending: EMERGENCY MEDICINE
Payer: MEDICARE

## 2019-12-28 ENCOUNTER — HOSPITAL ENCOUNTER (EMERGENCY)
Facility: CLINIC | Age: 74
Discharge: HOME | End: 2019-12-28
Attending: EMERGENCY MEDICINE | Admitting: EMERGENCY MEDICINE
Payer: MEDICARE

## 2019-12-28 VITALS
OXYGEN SATURATION: 94 % | SYSTOLIC BLOOD PRESSURE: 143 MMHG | HEART RATE: 84 BPM | DIASTOLIC BLOOD PRESSURE: 79 MMHG | RESPIRATION RATE: 15 BRPM | TEMPERATURE: 98.2 F

## 2019-12-28 DIAGNOSIS — R53.1 WEAKNESS: ICD-10-CM
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
## 2019-12-28 LAB
ALBUMIN SERPL-MCNC: 3.9 G/DL (ref 3.4–5)
ALBUMIN UR-MCNC: 10 MG/DL
ALP SERPL-CCNC: 97 U/L (ref 40–150)
ALT SERPL W P-5'-P-CCNC: 33 U/L (ref 0–50)
ANION GAP SERPL CALCULATED.3IONS-SCNC: 6 MMOL/L (ref 3–14)
APPEARANCE UR: CLEAR
AST SERPL W P-5'-P-CCNC: 39 U/L (ref 0–45)
BASOPHILS # BLD AUTO: 0 10E9/L (ref 0–0.2)
BASOPHILS NFR BLD AUTO: 0.4 %
BILIRUB SERPL-MCNC: 0.7 MG/DL (ref 0.2–1.3)
BILIRUB UR QL STRIP: NEGATIVE
BUN SERPL-MCNC: 26 MG/DL (ref 7–30)
CALCIUM SERPL-MCNC: 9.4 MG/DL (ref 8.5–10.1)
CHLORIDE SERPL-SCNC: 104 MMOL/L (ref 94–109)
CO2 SERPL-SCNC: 29 MMOL/L (ref 20–32)
COLOR UR AUTO: YELLOW
CREAT SERPL-MCNC: 1.03 MG/DL (ref 0.52–1.04)
DIFFERENTIAL METHOD BLD: ABNORMAL
EOSINOPHIL # BLD AUTO: 0 10E9/L (ref 0–0.7)
EOSINOPHIL NFR BLD AUTO: 0.1 %
ERYTHROCYTE [DISTWIDTH] IN BLOOD BY AUTOMATED COUNT: 13.1 % (ref 10–15)
GFR SERPL CREATININE-BSD FRML MDRD: 53 ML/MIN/{1.73_M2}
GLUCOSE SERPL-MCNC: 94 MG/DL (ref 70–99)
GLUCOSE UR STRIP-MCNC: NEGATIVE MG/DL
HCT VFR BLD AUTO: 40.4 % (ref 35–47)
HGB BLD-MCNC: 12.7 G/DL (ref 11.7–15.7)
HGB UR QL STRIP: NEGATIVE
IMM GRANULOCYTES # BLD: 0 10E9/L (ref 0–0.4)
IMM GRANULOCYTES NFR BLD: 0.4 %
INR PPP: 0.97 (ref 0.86–1.14)
KETONES UR STRIP-MCNC: NEGATIVE MG/DL
LACTATE BLD-SCNC: 1.4 MMOL/L (ref 0.7–2)
LEUKOCYTE ESTERASE UR QL STRIP: NEGATIVE
LYMPHOCYTES # BLD AUTO: 0.9 10E9/L (ref 0.8–5.3)
LYMPHOCYTES NFR BLD AUTO: 9.8 %
MAGNESIUM SERPL-MCNC: 2.2 MG/DL (ref 1.6–2.3)
MCH RBC QN AUTO: 31.4 PG (ref 26.5–33)
MCHC RBC AUTO-ENTMCNC: 31.4 G/DL (ref 31.5–36.5)
MCV RBC AUTO: 100 FL (ref 78–100)
MONOCYTES # BLD AUTO: 0.8 10E9/L (ref 0–1.3)
MONOCYTES NFR BLD AUTO: 8.5 %
MUCOUS THREADS #/AREA URNS LPF: PRESENT /LPF
NEUTROPHILS # BLD AUTO: 7.3 10E9/L (ref 1.6–8.3)
NEUTROPHILS NFR BLD AUTO: 80.8 %
NITRATE UR QL: NEGATIVE
NRBC # BLD AUTO: 0 10*3/UL
NRBC BLD AUTO-RTO: 0 /100
PH UR STRIP: 6.5 PH (ref 5–7)
PLATELET # BLD AUTO: 130 10E9/L (ref 150–450)
POTASSIUM SERPL-SCNC: 4 MMOL/L (ref 3.4–5.3)
PROT SERPL-MCNC: 7.3 G/DL (ref 6.8–8.8)
RBC # BLD AUTO: 4.05 10E12/L (ref 3.8–5.2)
RBC #/AREA URNS AUTO: 2 /HPF (ref 0–2)
SODIUM SERPL-SCNC: 139 MMOL/L (ref 133–144)
SOURCE: ABNORMAL
SP GR UR STRIP: 1.02 (ref 1–1.03)
SQUAMOUS #/AREA URNS AUTO: <1 /HPF (ref 0–1)
T4 FREE SERPL-MCNC: 0.87 NG/DL (ref 0.76–1.46)
TROPONIN I SERPL-MCNC: <0.015 UG/L (ref 0–0.04)
TSH SERPL DL<=0.005 MIU/L-ACNC: 8.63 MU/L (ref 0.4–4)
UROBILINOGEN UR STRIP-MCNC: NORMAL MG/DL (ref 0–2)
WBC # BLD AUTO: 9 10E9/L (ref 4–11)
WBC #/AREA URNS AUTO: 0 /HPF (ref 0–5)

## 2019-12-28 PROCEDURE — 71045 X-RAY EXAM CHEST 1 VIEW: CPT

## 2019-12-28 PROCEDURE — 83605 ASSAY OF LACTIC ACID: CPT | Performed by: EMERGENCY MEDICINE

## 2019-12-28 PROCEDURE — 85610 PROTHROMBIN TIME: CPT | Performed by: EMERGENCY MEDICINE

## 2019-12-28 PROCEDURE — 93005 ELECTROCARDIOGRAM TRACING: CPT

## 2019-12-28 PROCEDURE — 81001 URINALYSIS AUTO W/SCOPE: CPT | Performed by: EMERGENCY MEDICINE

## 2019-12-28 PROCEDURE — 84443 ASSAY THYROID STIM HORMONE: CPT | Performed by: EMERGENCY MEDICINE

## 2019-12-28 PROCEDURE — 85025 COMPLETE CBC W/AUTO DIFF WBC: CPT | Performed by: EMERGENCY MEDICINE

## 2019-12-28 PROCEDURE — 25800030 ZZH RX IP 258 OP 636: Performed by: EMERGENCY MEDICINE

## 2019-12-28 PROCEDURE — 84484 ASSAY OF TROPONIN QUANT: CPT | Performed by: EMERGENCY MEDICINE

## 2019-12-28 PROCEDURE — 70450 CT HEAD/BRAIN W/O DYE: CPT

## 2019-12-28 PROCEDURE — 83735 ASSAY OF MAGNESIUM: CPT | Performed by: EMERGENCY MEDICINE

## 2019-12-28 PROCEDURE — 84439 ASSAY OF FREE THYROXINE: CPT | Performed by: EMERGENCY MEDICINE

## 2019-12-28 PROCEDURE — 96360 HYDRATION IV INFUSION INIT: CPT

## 2019-12-28 PROCEDURE — 96361 HYDRATE IV INFUSION ADD-ON: CPT

## 2019-12-28 PROCEDURE — 72125 CT NECK SPINE W/O DYE: CPT

## 2019-12-28 PROCEDURE — 99285 EMERGENCY DEPT VISIT HI MDM: CPT | Mod: 25

## 2019-12-28 PROCEDURE — 80053 COMPREHEN METABOLIC PANEL: CPT | Performed by: EMERGENCY MEDICINE

## 2019-12-28 RX ORDER — SODIUM CHLORIDE 9 MG/ML
1000 INJECTION, SOLUTION INTRAVENOUS CONTINUOUS
Status: DISCONTINUED | OUTPATIENT
Start: 2019-12-28 | End: 2019-12-28 | Stop reason: HOSPADM

## 2019-12-28 RX ORDER — ATENOLOL 50 MG/1
150 TABLET ORAL DAILY
COMMUNITY
Start: 2018-05-21

## 2019-12-28 RX ORDER — BUPROPION HYDROCHLORIDE 150 MG/1
450 TABLET ORAL EVERY MORNING
COMMUNITY
Start: 2018-05-21

## 2019-12-28 RX ORDER — ASCORBIC ACID 500 MG
1000 TABLET ORAL
COMMUNITY
Start: 2019-03-07 | End: 2020-11-10

## 2019-12-28 RX ORDER — UBIDECARENONE 100 MG
100 CAPSULE ORAL
COMMUNITY
Start: 2014-11-20 | End: 2020-11-10

## 2019-12-28 RX ORDER — CLOTRIMAZOLE AND BETAMETHASONE DIPROPIONATE 10; .64 MG/G; MG/G
CREAM TOPICAL 2 TIMES DAILY
COMMUNITY
Start: 2019-11-11

## 2019-12-28 RX ORDER — LIDOCAINE 40 MG/G
CREAM TOPICAL
Status: DISCONTINUED | OUTPATIENT
Start: 2019-12-28 | End: 2019-12-28 | Stop reason: HOSPADM

## 2019-12-28 RX ADMIN — SODIUM CHLORIDE 1000 ML: 9 INJECTION, SOLUTION INTRAVENOUS at 13:08

## 2019-12-28 ASSESSMENT — ENCOUNTER SYMPTOMS
WOUND: 1
NECK PAIN: 0
BACK PAIN: 0

## 2019-12-28 NOTE — ED NOTES
"Bed: ED28  Expected date: 12/28/19  Expected time: 11:29 AM  Means of arrival: Ambulance  Comments:  HE- 74y, F, \"found down\"- no complaints    "

## 2019-12-28 NOTE — ED PROVIDER NOTES
"  History     Chief Complaint:    Fall    The history is provided by the patient and the EMS personnel.      Fern Simons is a 74 year old female with a history of hallucinations, hypertension, and mild cognitive impairment who arrived via EMS from her assisted living facility in [place] and presents after a fall. Around 0800, the patient was normal. However, when caregivers checked on her again around 0830 and they found her laying flat on the floor. The patient states that she \"melted down\" on to the floor. The patient denies hitting her head or losing consciousness. She denies any neck or back pain. The patient typically uses a cane to get ambulate. She is not on any blood thinners. In addition, the patient has swelling and a bruise around her right eye that she obtained in a fall 1 week ago.     Allergies:  Ciprofloxacin  Sulfa Drugs     Medications:    Wellbutrin SR  Synthroid  Zoloft  Fish oil  Glutamine  Tenormin  Mycostatin  Magnesium  Lasix  Coenzyme Q10    Past Medical History:    Depression  Hypertension  Thyroid disease  Mild cognitive impairment  Candidal intertrigo  Depression  Arthritis  Hallucinations   Vitamin D deficiency  Labile blood pressure  Dystrophic nail  Mycotic toenails  Actinic keratosis  Obesity  Compulsive overeater  Chronic low back pain  ADD  Esophageal reflux  Hypothyroidism  Thygeson's superficial punctate keratitis  Osteoarthritis  Right inguinal hernia  Bulimia  Congenital hiatus hernia    Past Surgical History:    Cholecystectomy  Tonsillectomy  Removal of toenail  Hernia repair, right inguinal  Bilateral upper lip blepharoplasty    Family History:    Diabetes - brother  Coronary artery disease - brother  Diabetes - father  Hypertension - father  Coronary artery disease - mother  Hypertension - mother  Diabetes - sister    Social History:  Negative for tobacco use - former smoker, quit 1980.  Negative for alcohol use.  Negative for drug use.  Marital Status:  Single [1] "     Review of Systems   Musculoskeletal: Negative for back pain and neck pain.   Skin: Positive for wound (Bruise near right eye from previous fall).   Neurological: Negative for syncope.   All other systems reviewed and are negative.        Physical Exam     Patient Vitals for the past 24 hrs:   BP Temp Temp src Pulse Heart Rate Resp SpO2   12/28/19 1445 (!) 143/79 -- -- 84 -- -- --   12/28/19 1430 (!) 151/111 -- -- 86 -- -- --   12/28/19 1415 (!) 158/79 -- -- 94 -- -- 94 %   12/28/19 1400 (!) 162/76 -- -- 79 -- -- 96 %   12/28/19 1345 (!) 166/82 -- -- -- -- -- --   12/28/19 1330 (!) 144/77 -- -- 78 79 15 93 %   12/28/19 1315 (!) 142/69 -- -- 75 74 18 95 %   12/28/19 1230 (!) 163/74 -- -- 73 70 15 --   12/28/19 1215 (!) 167/87 -- -- 73 73 16 --   12/28/19 1200 (!) 173/72 -- -- 77 70 17 97 %   12/28/19 1149 (!) 174/81 98.2  F (36.8  C) Oral -- 74 16 95 %     Physical Exam  Constitutional:       Appearance: She is well-developed.   HENT:      Head: Atraumatic.      Right Ear: Tympanic membrane and external ear normal.      Left Ear: Tympanic membrane and external ear normal.      Mouth/Throat:      Mouth: Mucous membranes are moist.      Pharynx: Oropharynx is clear. No oropharyngeal exudate.   Eyes:      General: No scleral icterus.     Extraocular Movements: Extraocular movements intact.      Pupils: Pupils are equal, round, and reactive to light.      Comments: Both eyes closed. R conjunctivae red but is baseline per patient   Neck:      Musculoskeletal: Normal range of motion and neck supple.   Cardiovascular:      Rate and Rhythm: Normal rate and regular rhythm.      Pulses: Normal pulses.      Heart sounds: Normal heart sounds. No murmur. No friction rub. No gallop.    Pulmonary:      Effort: Pulmonary effort is normal. No respiratory distress.      Breath sounds: Normal breath sounds. No wheezing or rales.   Abdominal:      General: Bowel sounds are normal. There is no distension.      Palpations: Abdomen is  soft. There is no mass.      Tenderness: There is no abdominal tenderness.   Musculoskeletal: Normal range of motion.   Skin:     General: Skin is warm and dry.      Findings: No rash.   Neurological:      General: No focal deficit present.      Mental Status: She is alert.      Cranial Nerves: No cranial nerve deficit.      Comments: Speech clear. Answers questions appropriately. 5/5 strength x 4.   Psychiatric:         Mood and Affect: Mood normal.      Comments: Eyes closed, but answers questions and follows commands           Emergency Department Course     ECG (11:50:55):  Rate 69 bpm. OH interval 168. QRS duration 92. QT/QTc 428/458. P-R-T axes 56 30 57. Normal sinus rhythm. Normal ECG. Interpreted at 1200 by Jeremy Monroy MD.    Imaging:  Radiology findings were communicated with the patient who voiced understanding of the findings.    CT Head w/o IV contrast:   The examination is moderately limited by motion artifact. No obvious intracranial hemorrhage or mass effect, as per radiology.    CT Cervical Spine w/o IV contrast:   Severely motion limited exam. No obvious fracture, as per radiology.    XR  Chest 1 View:   Single portable AP view of the chest was obtained. Enlarged cardiac silhouette. No focal pulmonary opacities. No significant pleural effusion or pneumothorax. Persistent asymmetric elevation of the right hemidiaphragm as compared to the left. Degenerative changes of the bilateral shoulder joints. No definite osseous pathology, as per radiology.     Laboratory:  Laboratory findings were communicated with the patient who voiced understanding of the findings.    CBC:  L o/w WNL (WBC 9.0, HGB 12.7)  INR: 0.97  CMP: GRF Estimate 53 L o/w WNL (Creatinine 1.03)  Magnesium: 2.2  Troponin I (1253): <0.015  Lactic acid whole blood (1231): 1.4  TSH: 8.63 H  T4 free: 0.87    UA: clear, yellow urine, protein albumin 10, mucous present o/w negative    Interventions:  1308: 0.9% sodium chloride BOLUS 1 L  IV    Emergency Department Course:  Past medical records, nursing notes, and vitals reviewed.    1200: I performed an exam of the patient as documented above.     EKG obtained in the ED, see results above.     IV was inserted and blood was drawn for laboratory testing, results above.    The patient provided a urine sample here in the emergency department. This was sent for laboratory testing, findings above.    The patient was sent for a head CT, a cervical spine CT, and a chest x-ray while in the emergency department, results above.     1447: I rechecked the patient and discussed the results of her workup thus far.     I personally reviewed the laboratory, EKG, and imaging results with the Patient and answered all related questions prior to discharge.     Findings and plan explained to the Patient. Patient discharged home with instructions regarding supportive care, medications, and reasons to return. The importance of close follow-up was reviewed.     Impression & Plan     Medical Decision Making:  Fern Simons is a 74 year old female who presents after a fall that was not witnessed in assisted living. Patient states that she did not hit her head or pass out, but the history is somewhat unclear. Trauma workup did not reveal any acute finding. We did also do some labs to evaluate for cause of weakness and that was fine. She was up and moving around on her own without difficulty at all. There was no significant pain on my exam and she has not had any fever or other symptoms. Currently she ambulated fine to the bathroom. She is comfortable going home. Transport was arranged for her to go home.    Diagnosis:    ICD-10-CM   1. Fall, initial encounter W19.XXXA   2. Weakness R53.1     Disposition:  Discharged to home.    Scribe Disclosure:  LACI, Stephanie Harris, am serving as a scribe at 12:03 PM on 12/28/2019 to document services personally performed by Jeremy Monroy MD based on my observations and the provider's  statements to me.     Stephanie Harris  12/28/2019   [facility] EMERGENCY DEPARTMENT       Jeremy Monroy MD  12/28/19 [id number]

## 2019-12-28 NOTE — ED TRIAGE NOTES
"Pt lives at an assisted living in [place], EMS re\ports, \"that around 0800 this morning staff checked on her and around 0830 she was found on the ground, she has swelling on the right eye that she got from a fall a week ago, pt states that she \"melted to the ground.\" Denies any neck or back pain Blood sugar 118, not on blood thinner and VSS and ABC's intact.  "

## 2019-12-28 NOTE — ED NOTES
Report called to assisted living facility, Nurse aware of pt discharge and is expecting pt to arrive and has appropriate staff to facilitate arrival to home

## 2019-12-28 NOTE — ED AVS SNAPSHOT
[facility] Emergency Department  [address]  [facility] [address]  Phone:  [phone]  Fax:  [phone]                                    Fern Simons   MRN: [id number]    Department:  [facility] Emergency Department   Date of Visit:  12/28/2019           After Visit Summary Signature Page    I have received my discharge instructions, and my questions have been answered. I have discussed any challenges I see with this plan with the nurse or doctor.    ..........................................................................................................................................  Patient/Patient Representative Signature      ..........................................................................................................................................  Patient Representative Print Name and Relationship to Patient    ..................................................               ................................................  Date                                   Time    ..........................................................................................................................................  Reviewed by Signature/Title    ...................................................              ..............................................  Date                                               Time          22EPIC Rev 08/18

## 2019-12-29 LAB — INTERPRETATION ECG - MUSE: NORMAL

## 2020-08-18 ENCOUNTER — APPOINTMENT (OUTPATIENT)
Dept: CT IMAGING | Facility: CLINIC | Age: 75
End: 2020-08-18
Attending: EMERGENCY MEDICINE
Payer: COMMERCIAL

## 2020-08-18 ENCOUNTER — HOSPITAL ENCOUNTER (EMERGENCY)
Facility: CLINIC | Age: 75
Discharge: HOME | End: 2020-08-18
Attending: EMERGENCY MEDICINE | Admitting: EMERGENCY MEDICINE
Payer: COMMERCIAL

## 2020-08-18 VITALS
HEIGHT: 62 IN | SYSTOLIC BLOOD PRESSURE: 183 MMHG | WEIGHT: 220 LBS | RESPIRATION RATE: 18 BRPM | HEART RATE: 61 BPM | DIASTOLIC BLOOD PRESSURE: 75 MMHG | TEMPERATURE: 98 F | BODY MASS INDEX: 40.48 KG/M2 | OXYGEN SATURATION: 96 %

## 2020-08-18 DIAGNOSIS — S01.81XA FACIAL LACERATION, INITIAL ENCOUNTER: ICD-10-CM

## 2020-08-18 DIAGNOSIS — S09.90XA CLOSED HEAD INJURY, INITIAL ENCOUNTER: ICD-10-CM

## 2020-08-18 DIAGNOSIS — S00.83XA CONTUSION OF FACE, INITIAL ENCOUNTER: ICD-10-CM
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
## 2020-08-18 PROCEDURE — 70486 CT MAXILLOFACIAL W/O DYE: CPT

## 2020-08-18 PROCEDURE — 12011 RPR F/E/E/N/L/M 2.5 CM/<: CPT

## 2020-08-18 PROCEDURE — 99284 EMERGENCY DEPT VISIT MOD MDM: CPT | Mod: 25

## 2020-08-18 PROCEDURE — 72125 CT NECK SPINE W/O DYE: CPT

## 2020-08-18 PROCEDURE — 70450 CT HEAD/BRAIN W/O DYE: CPT

## 2020-08-18 ASSESSMENT — MIFFLIN-ST. JEOR: SCORE: 1441.16

## 2020-08-18 ASSESSMENT — ENCOUNTER SYMPTOMS
WOUND: 1
NECK STIFFNESS: 1

## 2020-08-18 NOTE — ED AVS SNAPSHOT
Emergency Department  [address] [place] [address]  Phone:  [phone]  Fax:  [phone]                                    Fern Simons   MRN: [id number]    Department:   Emergency Department   Date of Visit:  8/18/2020           After Visit Summary Signature Page    I have received my discharge instructions, and my questions have been answered. I have discussed any challenges I see with this plan with the nurse or doctor.    ..........................................................................................................................................  Patient/Patient Representative Signature      ..........................................................................................................................................  Patient Representative Print Name and Relationship to Patient    ..................................................               ................................................  Date                                   Time    ..........................................................................................................................................  Reviewed by Signature/Title    ...................................................              ..............................................  Date                                               Time          22EPIC Rev 08/18

## 2020-08-18 NOTE — ED NOTES
Bed: ED03  Expected date:   Expected time:   Means of arrival:   Comments:  [facility] 75f fall from chair nose lac no thinners eta 11

## 2020-08-18 NOTE — ED PROVIDER NOTES
History   Chief Complaint:  Fall    HPI   Fern Simons is a 75 year old female with history of hypertension and thyroid disease who presents via EMS after a fall from her chair. The patient was sleeping in her recliner and tipped forward on to the floor scrapping her nose. She was sleeping with her glasses on.  She notes mild neck stiffness secondary to 'holding the rag' but has no other injuries or concerns. She is not anticoagulated. She has a virtual visit at 1140    Allergies:  Ciprofloxacin  Sulfa Drugs    Medications:   Acetylcysteine  Atenolol  Wellbutrin  Levothyroxine  Sertraline     Past Medical History:    Depressive disorder  Hypertension   Mild cognitive impairment  Arthritis   Candidal intertrigo  Obesity   Compulsive overeater  Chronic low back pain  Thyroid disease     Past Surgical History:    Cholecystectomy  Tonsillectomy  Hernia repair  Bilateral upper lid blepharoplasty      Family History:    Diabetes mellitus  Heart disease   Hypertension    Social History:  Smoking Status: former smoker, quit 1980  Smokeless Tobacco: Never Used  Alcohol Use: No  Drug Use: No  PCP: [facility]     Review of Systems   Musculoskeletal: Positive for neck stiffness.   Skin: Positive for wound.   All other systems reviewed and are negative.    Physical Exam     Patient Vitals for the past 24 hrs:   BP Temp Temp src Pulse Resp SpO2 Height Weight   08/18/20 0945 -- -- -- -- -- 96 % -- --   08/18/20 0930 (!) 183/75 -- -- 61 -- 98 % -- --   08/18/20 0915 (!) 178/66 -- -- 63 -- 97 % -- --   08/18/20 0900 (!) 183/70 -- -- 60 -- 98 % -- --   08/18/20 0845 (!) 180/75 -- -- 60 -- 98 % -- --   08/18/20 0830 (!) 178/66 -- -- 59 -- 95 % -- --   08/18/20 0815 (!) 177/76 -- -- 59 -- 99 % -- --   08/18/20 0730 (!) 157/70 -- -- 59 -- 96 % -- --   08/18/20 0715 (!) 164/76 -- -- 57 -- 96 % -- --   08/18/20 0700 (!) 165/70 -- -- 57 -- 96 % -- --   08/18/20 0655 (!) 172/66 -- -- 55 -- 99 % -- --   08/18/20 0651 (!)  "178/76 98  F (36.7  C) Oral 57 18 96 % 1.575 m (5' 2\") 99.8 kg (220 lb)       Physical Exam    Nursing note and vitals reviewed.  General: Oriented to person, place, and time. Appears well-developed and well-nourished.   Head: No signs of trauma.   Mouth/Throat: Oropharynx is clear and moist.   Eyes: Conjunctivae are normal. Pupils are equal, round, and reactive to light.   Neck: Normal range of motion. No nuchal rigidity.   Cardiovascular: Normal rate and regular rhythm.    Respiratory: Effort normal and breath sounds normal. No respiratory distress.   Abdominal: Soft. There is no tenderness. There is no guarding.   Musculoskeletal: Normal range of motion. no edema.   Neurological: The patient is alert and oriented to person, place, and time.  PERRLA, EOMI, visual fields intact, strength in upper/lower extremities normal and symmetrical.   Sensation normal. Speech normal  GCS eye subscore is 4. GCS verbal subscore is 5. GCS motor subscore is 6.   Skin: Skin is warm and dry. No rash noted. 2 cm laceration to the bridge of the nose. Contusion and abrasion over right lower forehead.  Psychiatric: normal mood and affect. behavior is normal.     Emergency Department Course   Imaging:  Radiology findings were communicated with the patient who voiced understanding of the findings.    CT Facial Bones without Contrast  IMPRESSION:   1. Cutaneous injury over the nose without underlying nasal bone  fracture.  2. No fracture of any of the facial bones.  Reading per radiology    Cervical spine CT w/o contrast  IMPRESSION:   1. No evidence of acute fracture or subluxation in the cervical spine.  2. Marked multilevel degenerative changes throughout the cervical  spine. Findings appear grossly similar to previous CT 12/28/2019  noting that the prior CT was limited due to motion artifact.  Reading per radiology    Head CT w/o contrast  IMPRESSION: No evidence of acute intracranial hemorrhage, mass, or  herniation.  Reading per " radiology     Procedures    Laceration Repair        LACERATION:  A simple clean 2 cm laceration.      LOCATION:  Bridge of nose      ANESTHESIA:  Local using Lidocaine 1% with epinephrine total of 2 mLs      PREPARATION:  Irrigation with Normal Saline and Shur Clens      DEBRIDEMENT:  no debridement      CLOSURE:  Wound was closed with One Layer.  Skin closed with 4 x 5.0 Nylon using interrupted sutures.    Emergency Department Course:  Nursing notes and vitals reviewed.    0713 I performed an exam of the patient as documented above.     The patient was sent for a CT head, CT Facial bones, and CT Cervical spine while in the emergency department, results above.      0845 I performed the laceration repair as noted above.    I rechecked the patient. Explained findings to the Patient.    Findings and plan explained to the Patient. Patient discharged home with instructions regarding supportive care, medications, and reasons to return.     Impression & Plan    Medical Decision Making:  The patient presents for evaluation of a head injury.  Due to the patient's age and mechanism, they were at risk of intracranial hemorrhage.  CT of the head, facial bones, and cervical spine were obtained and are negative for acute ICH or fracture.  The CT result was discussed with the patient.  The possibility of delayed onset bleeding was discussed with the patient and they be accompanied by a responsible person for the next 24 hours to observe for any neurologic changes.  Head injury precautions and reasons to return to the emergency department were discussed.  The patient's head laceration was repaired as noted above.  There was no evidence of deep structure injury, skull fracture or violation of the galea.  Patient was given wound care instructions and will follow up with PCP for suture removal. Based on a full exam, I did not have concern for any additional traumatic injuries.  The patient describes a mechanical accident and  therefore no additional evaluation for metabolic or cardiac etiology was warranted at this time.  The patient will follow up with primary care physician  within 2 days.    Diagnosis:    ICD-10-CM    1. Closed head injury, initial encounter  S09.90XA    2. Fall, initial encounter  W19.XXXA    3. Facial laceration, initial encounter  S01.81XA    4. Contusion of face, initial encounter  S00.83XA        Disposition:   discharged to home    Scribe Disclosure:  Lorenza AGUERO, am serving as a scribe at 6:51 AM on 8/18/2020 to document services personally performed by Eliseo Smith MD based on my observations and the provider's statements to me.   [facility] EMERGENCY DEPARTMENT       Eliseo Smith MD  08/18/20 [id number]

## 2020-11-10 ENCOUNTER — APPOINTMENT (OUTPATIENT)
Dept: GENERAL RADIOLOGY | Facility: CLINIC | Age: 75
End: 2020-11-10
Attending: EMERGENCY MEDICINE
Payer: COMMERCIAL

## 2020-11-10 ENCOUNTER — APPOINTMENT (OUTPATIENT)
Dept: CT IMAGING | Facility: CLINIC | Age: 75
End: 2020-11-10
Attending: EMERGENCY MEDICINE
Payer: COMMERCIAL

## 2020-11-10 ENCOUNTER — HOSPITAL ENCOUNTER (OUTPATIENT)
Facility: CLINIC | Age: 75
Setting detail: OBSERVATION
Discharge: NURSING, REHAB OR LONG-TERM CARE | End: 2020-11-20
Attending: EMERGENCY MEDICINE | Admitting: EMERGENCY MEDICINE
Payer: COMMERCIAL

## 2020-11-10 DIAGNOSIS — F03.90 DEMENTIA WITHOUT BEHAVIORAL DISTURBANCE, UNSPECIFIED DEMENTIA TYPE: Primary | ICD-10-CM

## 2020-11-10 DIAGNOSIS — R41.82 ALTERED MENTAL STATUS, UNSPECIFIED ALTERED MENTAL STATUS TYPE: ICD-10-CM

## 2020-11-10 DIAGNOSIS — S52.502A CLOSED FRACTURE OF DISTAL END OF LEFT RADIUS, UNSPECIFIED FRACTURE MORPHOLOGY, INITIAL ENCOUNTER: ICD-10-CM

## 2020-11-10 DIAGNOSIS — R29.6 RECURRENT FALLS: ICD-10-CM

## 2020-11-10 DIAGNOSIS — N28.9 RENAL INSUFFICIENCY: ICD-10-CM

## 2020-11-10 LAB
ALBUMIN SERPL-MCNC: 3.5 G/DL (ref 3.4–5)
ALBUMIN UR-MCNC: NEGATIVE MG/DL
ALP SERPL-CCNC: 82 U/L (ref 40–150)
ALT SERPL W P-5'-P-CCNC: 20 U/L (ref 0–50)
ANION GAP SERPL CALCULATED.3IONS-SCNC: 2 MMOL/L (ref 3–14)
ANISOCYTOSIS BLD QL SMEAR: SLIGHT
APAP SERPL-MCNC: <2 MG/L (ref 10–20)
APPEARANCE UR: CLEAR
AST SERPL W P-5'-P-CCNC: 16 U/L (ref 0–45)
BASOPHILS # BLD AUTO: 0 10E9/L (ref 0–0.2)
BASOPHILS NFR BLD AUTO: 0.4 %
BILIRUB SERPL-MCNC: 0.6 MG/DL (ref 0.2–1.3)
BILIRUB UR QL STRIP: NEGATIVE
BUN SERPL-MCNC: 26 MG/DL (ref 7–30)
CALCIUM SERPL-MCNC: 9.2 MG/DL (ref 8.5–10.1)
CHLORIDE SERPL-SCNC: 104 MMOL/L (ref 94–109)
CO2 SERPL-SCNC: 31 MMOL/L (ref 20–32)
COLOR UR AUTO: YELLOW
CREAT SERPL-MCNC: 1.22 MG/DL (ref 0.52–1.04)
DIFFERENTIAL METHOD BLD: ABNORMAL
EOSINOPHIL # BLD AUTO: 0 10E9/L (ref 0–0.7)
EOSINOPHIL NFR BLD AUTO: 0.2 %
ERYTHROCYTE [DISTWIDTH] IN BLOOD BY AUTOMATED COUNT: 14.1 % (ref 10–15)
ETHANOL SERPL-MCNC: <0.01 G/DL
GFR SERPL CREATININE-BSD FRML MDRD: 43 ML/MIN/{1.73_M2}
GLUCOSE SERPL-MCNC: 99 MG/DL (ref 70–99)
GLUCOSE UR STRIP-MCNC: NEGATIVE MG/DL
HCT VFR BLD AUTO: 36.8 % (ref 35–47)
HGB BLD-MCNC: 12 G/DL (ref 11.7–15.7)
HGB UR QL STRIP: NEGATIVE
IMM GRANULOCYTES # BLD: 0 10E9/L (ref 0–0.4)
IMM GRANULOCYTES NFR BLD: 0.4 %
INTERPRETATION ECG - MUSE: NORMAL
KETONES UR STRIP-MCNC: NEGATIVE MG/DL
LEUKOCYTE ESTERASE UR QL STRIP: NEGATIVE
LYMPHOCYTES # BLD AUTO: 0.9 10E9/L (ref 0.8–5.3)
LYMPHOCYTES NFR BLD AUTO: 16.9 %
MCH RBC QN AUTO: 32.7 PG (ref 26.5–33)
MCHC RBC AUTO-ENTMCNC: 32.6 G/DL (ref 31.5–36.5)
MCV RBC AUTO: 100 FL (ref 78–100)
MONOCYTES # BLD AUTO: 0.4 10E9/L (ref 0–1.3)
MONOCYTES NFR BLD AUTO: 8.1 %
MUCOUS THREADS #/AREA URNS LPF: PRESENT /LPF
NEUTROPHILS # BLD AUTO: 4 10E9/L (ref 1.6–8.3)
NEUTROPHILS NFR BLD AUTO: 74 %
NITRATE UR QL: NEGATIVE
NRBC # BLD AUTO: 0 10*3/UL
NRBC BLD AUTO-RTO: 0 /100
PH UR STRIP: 6 PH (ref 5–7)
PLATELET # BLD AUTO: 126 10E9/L (ref 150–450)
PLATELET # BLD EST: ABNORMAL 10*3/UL
POTASSIUM SERPL-SCNC: 3.4 MMOL/L (ref 3.4–5.3)
PROT SERPL-MCNC: 7.3 G/DL (ref 6.8–8.8)
RBC # BLD AUTO: 3.67 10E12/L (ref 3.8–5.2)
RBC #/AREA URNS AUTO: 0 /HPF (ref 0–2)
SALICYLATES SERPL-MCNC: <2 MG/DL
SODIUM SERPL-SCNC: 137 MMOL/L (ref 133–144)
SOURCE: ABNORMAL
SP GR UR STRIP: 1.01 (ref 1–1.03)
SQUAMOUS #/AREA URNS AUTO: 2 /HPF (ref 0–1)
TROPONIN I SERPL-MCNC: <0.015 UG/L (ref 0–0.04)
UROBILINOGEN UR STRIP-MCNC: NORMAL MG/DL (ref 0–2)
WBC # BLD AUTO: 5.3 10E9/L (ref 4–11)
WBC #/AREA URNS AUTO: <1 /HPF (ref 0–5)

## 2020-11-10 PROCEDURE — 73590 X-RAY EXAM OF LOWER LEG: CPT | Mod: RT

## 2020-11-10 PROCEDURE — 93005 ELECTROCARDIOGRAM TRACING: CPT

## 2020-11-10 PROCEDURE — 85025 COMPLETE CBC W/AUTO DIFF WBC: CPT | Performed by: EMERGENCY MEDICINE

## 2020-11-10 PROCEDURE — C9803 HOPD COVID-19 SPEC COLLECT: HCPCS

## 2020-11-10 PROCEDURE — G0378 HOSPITAL OBSERVATION PER HR: HCPCS

## 2020-11-10 PROCEDURE — U0003 INFECTIOUS AGENT DETECTION BY NUCLEIC ACID (DNA OR RNA); SEVERE ACUTE RESPIRATORY SYNDROME CORONAVIRUS 2 (SARS-COV-2) (CORONAVIRUS DISEASE [COVID-19]), AMPLIFIED PROBE TECHNIQUE, MAKING USE OF HIGH THROUGHPUT TECHNOLOGIES AS DESCRIBED BY CMS-2020-01-R: HCPCS | Performed by: EMERGENCY MEDICINE

## 2020-11-10 PROCEDURE — 70450 CT HEAD/BRAIN W/O DYE: CPT

## 2020-11-10 PROCEDURE — 80053 COMPREHEN METABOLIC PANEL: CPT | Performed by: EMERGENCY MEDICINE

## 2020-11-10 PROCEDURE — 84484 ASSAY OF TROPONIN QUANT: CPT | Performed by: EMERGENCY MEDICINE

## 2020-11-10 PROCEDURE — 80329 ANALGESICS NON-OPIOID 1 OR 2: CPT | Performed by: EMERGENCY MEDICINE

## 2020-11-10 PROCEDURE — 80329 ANALGESICS NON-OPIOID 1 OR 2: CPT | Mod: 59 | Performed by: EMERGENCY MEDICINE

## 2020-11-10 PROCEDURE — 99220 PR INITIAL OBSERVATION CARE,LEVEL III: CPT | Mod: AI | Performed by: HOSPITALIST

## 2020-11-10 PROCEDURE — 99207 PR CDG-EXAM COMPONENT: MEETS COMPREHENSIVE - UP CODED: CPT | Performed by: HOSPITALIST

## 2020-11-10 PROCEDURE — 71046 X-RAY EXAM CHEST 2 VIEWS: CPT

## 2020-11-10 PROCEDURE — 73562 X-RAY EXAM OF KNEE 3: CPT | Mod: RT

## 2020-11-10 PROCEDURE — 250N000013 HC RX MED GY IP 250 OP 250 PS 637: Performed by: HOSPITALIST

## 2020-11-10 PROCEDURE — 80320 DRUG SCREEN QUANTALCOHOLS: CPT | Performed by: EMERGENCY MEDICINE

## 2020-11-10 PROCEDURE — 99285 EMERGENCY DEPT VISIT HI MDM: CPT

## 2020-11-10 PROCEDURE — 73110 X-RAY EXAM OF WRIST: CPT | Mod: LT

## 2020-11-10 PROCEDURE — 81001 URINALYSIS AUTO W/SCOPE: CPT | Performed by: EMERGENCY MEDICINE

## 2020-11-10 PROCEDURE — 73090 X-RAY EXAM OF FOREARM: CPT | Mod: LT

## 2020-11-10 RX ORDER — FLUCONAZOLE 150 MG/1
150 TABLET ORAL WEEKLY
COMMUNITY

## 2020-11-10 RX ORDER — POLYETHYLENE GLYCOL 3350 17 G/17G
17 POWDER, FOR SOLUTION ORAL DAILY
Status: DISCONTINUED | OUTPATIENT
Start: 2020-11-10 | End: 2020-11-20 | Stop reason: HOSPADM

## 2020-11-10 RX ORDER — CLOTRIMAZOLE AND BETAMETHASONE DIPROPIONATE 10; .64 MG/G; MG/G
CREAM TOPICAL 2 TIMES DAILY
Status: DISCONTINUED | OUTPATIENT
Start: 2020-11-10 | End: 2020-11-20 | Stop reason: HOSPADM

## 2020-11-10 RX ORDER — FLUCONAZOLE 150 MG/1
150 TABLET ORAL
Status: DISCONTINUED | OUTPATIENT
Start: 2020-11-11 | End: 2020-11-10

## 2020-11-10 RX ORDER — MULTIPLE VITAMINS W/ MINERALS TAB 9MG-400MCG
1 TAB ORAL DAILY
Status: DISCONTINUED | OUTPATIENT
Start: 2020-11-11 | End: 2020-11-20 | Stop reason: HOSPADM

## 2020-11-10 RX ORDER — CLOTRIMAZOLE 1 %
CREAM (GRAM) TOPICAL 2 TIMES DAILY
Status: DISCONTINUED | OUTPATIENT
Start: 2020-11-10 | End: 2020-11-20 | Stop reason: HOSPADM

## 2020-11-10 RX ORDER — LANOLIN ALCOHOL/MO/W.PET/CERES
1 CREAM (GRAM) TOPICAL AT BEDTIME
COMMUNITY

## 2020-11-10 RX ORDER — ONDANSETRON 4 MG/1
4 TABLET, ORALLY DISINTEGRATING ORAL EVERY 6 HOURS PRN
Status: DISCONTINUED | OUTPATIENT
Start: 2020-11-10 | End: 2020-11-20 | Stop reason: HOSPADM

## 2020-11-10 RX ORDER — LEVOTHYROXINE SODIUM 125 UG/1
125 TABLET ORAL
COMMUNITY

## 2020-11-10 RX ORDER — QUETIAPINE FUMARATE 100 MG/1
150 TABLET, FILM COATED ORAL EVERY EVENING
Status: ON HOLD | COMMUNITY
End: 2020-11-19

## 2020-11-10 RX ORDER — TRIAMCINOLONE ACETONIDE 1 MG/G
CREAM TOPICAL 3 TIMES DAILY
Status: DISCONTINUED | OUTPATIENT
Start: 2020-11-10 | End: 2020-11-20 | Stop reason: HOSPADM

## 2020-11-10 RX ORDER — AMOXICILLIN 250 MG
1 CAPSULE ORAL 2 TIMES DAILY
Status: DISCONTINUED | OUTPATIENT
Start: 2020-11-10 | End: 2020-11-20 | Stop reason: HOSPADM

## 2020-11-10 RX ORDER — NYSTATIN 100000 [USP'U]/G
POWDER TOPICAL 2 TIMES DAILY
COMMUNITY

## 2020-11-10 RX ORDER — ACETAMINOPHEN 325 MG/1
650 TABLET ORAL EVERY 4 HOURS PRN
Status: DISCONTINUED | OUTPATIENT
Start: 2020-11-10 | End: 2020-11-20 | Stop reason: HOSPADM

## 2020-11-10 RX ORDER — AMOXICILLIN 250 MG
2 CAPSULE ORAL 2 TIMES DAILY
Status: DISCONTINUED | OUTPATIENT
Start: 2020-11-10 | End: 2020-11-20 | Stop reason: HOSPADM

## 2020-11-10 RX ORDER — FUROSEMIDE 40 MG
40 TABLET ORAL DAILY
Status: DISCONTINUED | OUTPATIENT
Start: 2020-11-11 | End: 2020-11-20 | Stop reason: HOSPADM

## 2020-11-10 RX ORDER — MULTIPLE VITAMINS W/ MINERALS TAB 9MG-400MCG
1 TAB ORAL DAILY
COMMUNITY

## 2020-11-10 RX ORDER — CYCLOSPORINE 0.5 MG/ML
1 EMULSION OPHTHALMIC 2 TIMES DAILY
COMMUNITY

## 2020-11-10 RX ORDER — TRIAMCINOLONE ACETONIDE 1 MG/G
CREAM TOPICAL 3 TIMES DAILY
COMMUNITY

## 2020-11-10 RX ORDER — ONDANSETRON 2 MG/ML
4 INJECTION INTRAMUSCULAR; INTRAVENOUS EVERY 6 HOURS PRN
Status: DISCONTINUED | OUTPATIENT
Start: 2020-11-10 | End: 2020-11-20 | Stop reason: HOSPADM

## 2020-11-10 RX ORDER — BUPROPION HYDROCHLORIDE 150 MG/1
450 TABLET ORAL EVERY MORNING
Status: DISCONTINUED | OUTPATIENT
Start: 2020-11-11 | End: 2020-11-20 | Stop reason: HOSPADM

## 2020-11-10 RX ORDER — FLUCONAZOLE 150 MG/1
150 TABLET ORAL
Status: DISCONTINUED | OUTPATIENT
Start: 2020-11-11 | End: 2020-11-20 | Stop reason: HOSPADM

## 2020-11-10 RX ORDER — SERTRALINE HYDROCHLORIDE 100 MG/1
200 TABLET, FILM COATED ORAL DAILY
Status: DISCONTINUED | OUTPATIENT
Start: 2020-11-11 | End: 2020-11-20 | Stop reason: HOSPADM

## 2020-11-10 RX ORDER — FUROSEMIDE 40 MG
40 TABLET ORAL DAILY
COMMUNITY

## 2020-11-10 RX ORDER — CLOTRIMAZOLE 1 %
CREAM (GRAM) TOPICAL 2 TIMES DAILY
COMMUNITY

## 2020-11-10 RX ORDER — CARBOXYMETHYLCELLULOSE SODIUM 10 MG/ML
1 GEL OPHTHALMIC 2 TIMES DAILY
Status: DISCONTINUED | OUTPATIENT
Start: 2020-11-10 | End: 2020-11-20 | Stop reason: HOSPADM

## 2020-11-10 RX ORDER — ATENOLOL 50 MG/1
150 TABLET ORAL DAILY
Status: DISCONTINUED | OUTPATIENT
Start: 2020-11-11 | End: 2020-11-20 | Stop reason: HOSPADM

## 2020-11-10 RX ORDER — ACETAMINOPHEN 650 MG/1
650 SUPPOSITORY RECTAL EVERY 4 HOURS PRN
Status: DISCONTINUED | OUTPATIENT
Start: 2020-11-10 | End: 2020-11-20 | Stop reason: HOSPADM

## 2020-11-10 RX ORDER — QUETIAPINE FUMARATE 50 MG/1
50 TABLET, FILM COATED ORAL EVERY MORNING
Status: ON HOLD | COMMUNITY
End: 2020-11-19

## 2020-11-10 RX ADMIN — TRIAMCINOLONE ACETONIDE: 1 CREAM TOPICAL at 17:26

## 2020-11-10 RX ADMIN — MICONAZOLE NITRATE: 20 POWDER TOPICAL at 20:41

## 2020-11-10 RX ADMIN — CLOTRIMAZOLE AND BETAMETHASONE DIPROPIONATE: 10; .5 CREAM TOPICAL at 20:40

## 2020-11-10 RX ADMIN — CLOTRIMAZOLE: 1 CREAM TOPICAL at 20:41

## 2020-11-10 RX ADMIN — POLYETHYLENE GLYCOL 3350 17 G: 17 POWDER, FOR SOLUTION ORAL at 17:25

## 2020-11-10 RX ADMIN — CARBOXYMETHYLCELLULOSE SODIUM 1 DROP: 10 GEL OPHTHALMIC at 20:40

## 2020-11-10 NOTE — ED NOTES
[facility]  ED Nurse Handoff Report    ED Chief complaint: Altered Mental Status and Fall      ED Diagnosis:   Final diagnoses:   None       Code Status: to be determined     Allergies:   Allergies   Allergen Reactions     Ciprofloxacin      Sulfa Drugs        Patient Story: pt comes from independent living in a senior home after a fall   Focused Assessment:  Pt reports falling out of her recliner chair last night; she was able to hit her button and get assistance up. This morning when they checked on her, she was altered and complaining of left wrist pain. She has abrasions and bruising on her face, left wrist and right knee. Pt is sleep; EMS checked her meds and 3 nights with of meds are gone. She possible took 3 dose of her night Seroquel. She has significant redness and yeast in her groin and under her breast    Treatments and/or interventions provided:   Patient's response to treatments and/or interventions:    To be done/followed up on inpatient unit:  monitor    Does this patient have any cognitive concerns?: Forgetful    Activity level - Baseline/Home:  Walker  Activity Level - Current:   Unknown    Patient's Preferred language: English   Needed?: No    Isolation: None  Infection: Not Applicable  Patient tested for COVID 19 prior to admission: YES  Bariatric?: No    Vital Signs:   Vitals:    11/10/20 1100   BP: (!) 140/65   Pulse: 58   Resp: 18   SpO2: 99%       Cardiac Rhythm:     Was the PSS-3 completed:   Yes  What interventions are required if any?               Family Comments:   OBS brochure/video discussed/provided to patient/family: N/A              Name of person given brochure if not patient:               Relationship to patient:     For the majority of the shift this patient's behavior was Green.   Behavioral interventions performed were .    ED NURSE PHONE NUMBER: *[phone]

## 2020-11-10 NOTE — PLAN OF CARE
A/OX3,forgetful .Denies pain.Up with 1 assist/walker/GB.Voiding well per bathroom.On regular diet.Redness blanchable noted between groins,abdominal fold,under the bilateral breast and buttocks, order cream applied.Multiple bruises from fall,bruises noted on right knee and left lower leg,abrasions on right forehead.Turned and repositioned Q 2hrs.Splint on left wrist,non surgical as of now.Neuro and ortho following.Will continue to monitor.

## 2020-11-10 NOTE — ED TRIAGE NOTES
Pt presents from independent living, where she has fallen 6 times in last 6 days. Pt has abd bloating, UA pending, laceration to right eyebrow, and may also have Seroquel for next 3 days.

## 2020-11-10 NOTE — H&P
[facility]    History and Physical - Hospitalist Service       Date of Admission:  11/10/2020    Assessment & Plan   Fern Simons is a 75 year old female admitted on 11/10/2020 with fall    Altered mental status possibly secondary to seroquel overdose  Delusions and hallucinations  Of note patient has been having increasing falls and increasing levels of hallucinations, paranoia, and cognitive decline  Some of the positive symptoms appear to be worsening despite escalating antipsychotics.  ?lewy body dementia  Given her falls, I think it reasonable to re-evaluate these medications and she likely needs more support at home   Plan  - hold seroquel currently, could probably restart tomorrow morning  - consult neurology for the concern of the outpatient psychiatrist. Has been very difficult on the patient and her brother to get an appointment  - PT/OT  - her facility told her brother they are able to administer her meds    Fall with left distal radial fracture  - in wrist splint  - ortho consult  - prn tylenol    Hypothyroidism  - resume levothyroxine    HTN  - continue home atenolol    Depression  - sertaline       Diet:   regular  DVT Prophylaxis: Pneumatic Compression Devices  Solano Catheter: not present  Code Status:   DNR/DNI discussed with brother         Disposition Plan   Expected discharge: 2 - 3 days, recommended to prior living arrangement once more awake and alert.  Entered: Kev Vera DO 11/10/2020, 1:45 PM     The patient's care was discussed with the Patient.    Kev Vera DO  [facility]  Contact information available via [facility] Paging/Directory      ______________________________________________________________________    Chief Complaint   AMS    History is obtained from the patient's brother and the ED    History of Present Illness   Fern Simons is a 75 year old female from [facility] that presents with  fall. The patient has had 7-8x falls in the last week. She is in independent living and has her medications set up for her but she takes her self, and there is some concern that 3 days of seroquel had been taken at once.    Today staff found her slumping down and very sleeping looking, so EMS was called. She was complaining of pain in her right knee and left wrist. She states that she thinks the seroquel dose looked like too much to her. She has some pain in the wrist and her back. Staff noted that 3 days of seroquel were missing.    Brother notes worsening cognition and her outpatient psychiatrist Dr. Haley had recommended a neuro eval for ? lewy body dementia.     Review of Systems    The 10 point Review of Systems is negative other than noted in the HPI or here.     Past Medical History    I have reviewed this patient's medical history and updated it with pertinent information if needed.   Past Medical History:   Diagnosis Date     Depressive disorder      Hypertension      Thyroid disease        Past Surgical History   I have reviewed this patient's surgical history and updated it with pertinent information if needed.  No past surgical history on file.    Social History   I have reviewed this patient's social history and updated it with pertinent information if needed.  Social History     Tobacco Use     Smoking status: Former Smoker     Quit date: 9/15/1980     Years since quittin.1     Smokeless tobacco: Never Used   Substance Use Topics     Alcohol use: No     Drug use: No       Family History   I have reviewed this patient's family history and updated it with pertinent information if needed.  Family History   Problem Relation Age of Onset     Diabetes Brother        Prior to Admission Medications   Prior to Admission Medications   Prescriptions Last Dose Informant Patient Reported? Taking?   MAGNESIUM CITRATE PO prn Nursing Home Yes Yes   Sig: Take 375 mg by mouth daily as needed (constipation)    QUEtiapine (SEROQUEL) 100 MG tablet  at 2000 correction Yes Yes   Sig: Take 150 mg by mouth every evening    QUEtiapine (SEROQUEL) 50 MG tablet  Nursing Home Yes Yes   Sig: Take 50 mg by mouth every morning hallucinations   Vitamin D3 (CHOLECALCIFEROL) 125 MCG (5000 UT) tablet  at 0800 correction Yes Yes   Sig: Take 125 mcg by mouth daily   atenolol (TENORMIN) 50 MG tablet  at 0800 correction Yes Yes   Sig: Take 150 mg by mouth daily    buPROPion (WELLBUTRIN XL) 150 MG 24 hr tablet  at 0800 correction Yes Yes   Sig: Take 450 mg by mouth every morning    clotrimazole (LOTRIMIN) 1 % external cream  Nursing Home Yes Yes   Sig: Apply topically 2 times daily candidial intertrigo   clotrimazole-betamethasone (LOTRISONE) 1-0.05 % external cream  Nursing Home Yes Yes   Sig: Apply topically 2 times daily candinitis   cycloSPORINE (RESTASIS) 0.05 % ophthalmic emulsion  Nursing Home Yes Yes   Sig: Place 1 drop Into the left eye 2 times daily   fluconazole (DIFLUCAN) 150 MG tablet  Nursing Home Yes Yes   Sig: Take 150 mg by mouth once a week On Wednesdays.  Ending 11/25/2020   furosemide (LASIX) 40 MG tablet  Nursing Home Yes Yes   Sig: Take 40 mg by mouth daily   levothyroxine (SYNTHROID/LEVOTHROID) 125 MCG tablet  at 0800 correction Yes Yes   Sig: Take 125 mcg by mouth every morning (before breakfast)   melatonin 3 MG tablet  at 2000 correction Yes Yes   Sig: Take 1 mg by mouth At Bedtime   multivitamin w/minerals (THERA-VIT-M) tablet  at 0800  Yes Yes   Sig: Take 1 tablet by mouth daily   nystatin (MYCOSTATIN) 916639 UNIT/GM external powder  Nursing Home Yes Yes   Sig: Apply topically 2 times daily Skin breakdown   sertraline (ZOLOFT) 100 MG tablet  at 0800 correction Yes Yes   Sig: Take 200 mg by mouth daily   triamcinolone (KENALOG) 0.1 % external cream  Nursing Home Yes Yes   Sig: Apply topically 3 times daily Candidal intertrigo      Facility-Administered Medications: None     Allergies   Allergies    Allergen Reactions     Ciprofloxacin      Sulfa Drugs        Physical Exam   Vital Signs: Temp: 97.6  F (36.4  C) Temp src: Oral BP: (!) 153/73 Pulse: 54   Resp: 14 SpO2: 99 %      Weight: 0 lbs 0 oz    General: Woman recumbent in room 8  HENT: right forehead ecchymoses, no step offs  Eyes: PERRL without proptosis  CV:  regular rhythm, cap refill normal in all extremities  Resp: CTAB, WOB WNL  GI: abdomen soft, nontender, no guarding  Skin:   No abrasion  Ecchymosis present to left wrist and right knee, and less so to the right forehead  No laceration  Neuro: awake, slightly drowsy, responds to basic questions and commands but poor historian, no nuchal rigidity,  equal, lift both legs off bed oriented to person but not place or date  Psych: cooperative    Data   Data reviewed today: I reviewed all medications, new labs and imaging results over the last 24 hours. I personally reviewed     EKG with NSR, qtc ok  CXR neg  L forearm/wrist xr with distal radiall fracture  Right knee/tib/fib negative      Recent Labs   Lab 11/10/20  1057   WBC 5.3   HGB 12.0      *      POTASSIUM 3.4   CHLORIDE 104   CO2 31   BUN 26   CR 1.22*   ANIONGAP 2*   PRASHANTH 9.2   GLC 99   ALBUMIN 3.5   PROTTOTAL 7.3   BILITOTAL 0.6   ALKPHOS 82   ALT 20   AST 16   TROPI <0.015     Most Recent 3 CBC's:  Recent Labs   Lab Test 11/10/20  1057 12/28/19  1222   WBC 5.3 9.0   HGB 12.0 12.7    100   * 130*     Most Recent 3 BMP's:  Recent Labs   Lab Test 11/10/20  1057 12/28/19  1222    139   POTASSIUM 3.4 4.0   CHLORIDE 104 104   CO2 31 29   BUN 26 26   CR 1.22* 1.03   ANIONGAP 2* 6   PRASHANTH 9.2 9.4   GLC 99 94     Most Recent 2 LFT's:  Recent Labs   Lab Test 11/10/20  1057 12/28/19  1222   AST 16 39   ALT 20 33   ALKPHOS 82 97   BILITOTAL 0.6 0.7     Most Recent 3 INR's:  Recent Labs   Lab Test 12/28/19  1222   INR 0.97     Recent Results (from the past 24 hour(s))   CT Head w/o Contrast    Narrative    CT OF  THE HEAD WITHOUT CONTRAST 11/10/2020 11:21 AM     COMPARISON: Head CT 8/18/2020    HISTORY: Fall, right forehead trauma, AMS with nonfocal exam.    TECHNIQUE: 5 mm thick axial CT images of the head were acquired  without IV contrast material.    FINDINGS: There is moderate diffuse cerebral volume loss. There are  subtle patchy areas of decreased density in the cerebral white matter  bilaterally that are consistent with sequela of chronic small vessel  ischemic disease.    The ventricles and basal cisterns are within normal limits in  configuration given the degree of cerebral volume loss. There is no  midline shift. There are no extra-axial fluid collections.    No intracranial hemorrhage, mass or recent infarct.    The visualized paranasal sinuses are well-aerated. There is no  mastoiditis. There are no fractures of the visualized bones.      Impression    IMPRESSION: Diffuse cerebral volume loss and cerebral white matter  changes consistent with chronic small vessel ischemic disease. No  evidence for acute intracranial pathology.      Radiation dose for this scan was reduced using automated exposure  control, adjustment of the mA and/or kV according to patient size, or  iterative reconstruction technique.    TAMIKA JIMENEZ MD   XR Chest 2 Views    Narrative    XR CHEST 2 VW  11/10/2020 12:01 PM       INDICATION: fall, weakness, no fever/cough  COMPARISON: 12/28/2019       Impression    IMPRESSION: Mild cardiomegaly. Otherwise negative chest.    MED WALKER MD   Radius/Ulna XR,  PA &LAT, left    Narrative    LEFT FOREARM TWO VIEWS, LEFT WRIST THREE OR MORE VIEWS 11/10/2020  12:02 PM     HISTORY: Fall with acute pain.    FINDINGS: Mild thumb CMC degenerative arthrosis.      Impression    IMPRESSION: Distal radius fracture. There is minimal displacement at  the dorsal aspect of the fracture.   XR Wrist Left G/E 3 Views    Narrative    LEFT FOREARM TWO VIEWS, LEFT WRIST THREE OR MORE VIEWS 11/10/2020  12:02 PM      HISTORY: Fall with acute pain.    FINDINGS: Mild thumb CMC degenerative arthrosis.      Impression    IMPRESSION: Distal radius fracture. There is minimal displacement at  the dorsal aspect of the fracture.   XR Knee Right 3 Views    Narrative    TIBIA AND FIBULA RIGHT TWO VIEWS, KNEE RIGHT THREE VIEWS   11/10/2020  12:04 PM     HISTORY: Fall with acute pain.    FINDINGS: Patellofemoral and lateral knee compartment osteoarthritis.      Impression    IMPRESSION: No fracture identified.   XR Tibia & Fibula Right 2 Views    Narrative    TIBIA AND FIBULA RIGHT TWO VIEWS, KNEE RIGHT THREE VIEWS   11/10/2020  12:04 PM     HISTORY: Fall with acute pain.    FINDINGS: Patellofemoral and lateral knee compartment osteoarthritis.      Impression    IMPRESSION: No fracture identified.

## 2020-11-10 NOTE — CONSULTS
[facility] [place]  [facility]    Neurology Consultation    Fern Simons MRN# [id number]   YOB: 1945 Age: 75 year old    Code Status:No CPR- Do NOT Intubate   Date of Admission: 11/10/2020  Date of Consult:11/10/2020                                                                                       Assessment and Plan:                                         #. Encephalopathy,sleepiness likely related to sedative effect.  Records suggest cognitive impairment, dementia.  Recent visual hallucinations concerning for Lewy body dementia.    --hold sedative/seroquel  Reevaluate tomorrow.  Recheck tsh as has been high in past.   #. Frequent falls/gait impairment, suspect possible Parkinsonism which will occur with Lewy Body dementia.  --will reevaluate tomorrow in person.    Obtain further hx from brother, JAIME tcb.      #. DVT Prophylaxis  --Per hospitalist service  #. PT/OT/Speech  --As tolerated  #. Nutrition / GI Prophylaxis  --Per recommendations of speech therapy    With history given, patient will need higher level of supervision of ADL, medications on discharge.       ----------------------------------------------------------------------------------  ----------------------------------------------------------------------------------  Reason for consult:see above       Chief Complaint:   Confusion/falls.  History is obtained from the patient / chart       History of Present Illness:   This patient is a 75 year old female who presents with hx per ED:    presents by EMS from an independent living facility with altered mental status and evaluation after a fall.  Per report, the patient fell out of her recliner last night. She states that she was able to get up off the floor with help from staff. This morning staff checked on her again per routine and found her confused and noticed that 3 nights worth of her evening Seroquel were gone. The patient states that  she hurts everywhere especially her left elbow and right knee. Patient normally uses a walker to ambulate.  She has apparently fallen almost daily during the past week, the circumstances of which are unclear       Patient reports moving to Assisted Living 2 years ago.  Stopped driving 8 months ago, still has car?  Frequent falling Using walker for 1 year.  Unclear why falls, last fall occurred while sitting in chair.    Excerpt from PCP note 10/5/20:  psych NP Charmaine Haley about [place]. She is concerned because despite increased doses of antipsychotic medications, the pt's visual hallucinations are not improving, she exhibits delusions and paranoia and cognitive decline. She is concerned about possibility of Lewy Body dementia.     Reviewed chart, pt was seen along with Dr Hubbard 10/2019 for this concern and his opinion was low concern for LBD. She had an MRI of the brain in 4/2019.     Plan will Be:  -hCarmaine will communicate with pt's brother who helps to coordinate her carwe           Past Medical History:     Past Medical History:   Diagnosis Date     Depressive disorder      Hypertension      Thyroid disease      Unspecified hypothyroidism       Esophageal reflux       Depressive disorder, not elsewhere classified   followed by psychiatry   Attention deficit disorder without mention of hyperactivity       Congenital hiatus hernia       Chronic low back pain       Bulimia   history in 1098's, none since   Major depressive disorder   recurrent episode, in partial or unspecified remission   Compulsive overeater       Cutaneous skin tags       Obesity (BMI 35.0-39.9 without comorbidity)       Dystrophic nail       Mycotic toenails       Actinic keratosis       Labile blood pressure       Arthritis, degenerative       Vitamin D deficiency       Anxiety       Hypothyroidism       Thygeson's superficial punctate keratitis       Cataract             Past Surgical History:   No past surgical history on file.    CHOLECYSTECTOMY 1998        TONSILLECTOMY 1970        COLONOSCOPY SCREENING 2005   wnl, 10 years     XR MAMMO BILATERAL DIAGNOSTIC ([place]) 07        removal of toenail          HERNIA REPAIR 2007   right inguinal     BILATERAL UPPER LID BLEPHAROPLASTY, NON COSMETIC               Social History:     Social History     Socioeconomic History     Marital status: Single     Spouse name: None     Number of children: None     Years of education: None     Highest education level: None   Occupational History     None   Social Needs     Financial resource strain: None     Food insecurity     Worry: None     Inability: None     Transportation needs     Medical: None     Non-medical: None   Tobacco Use     Smoking status: Former Smoker     Quit date: 9/15/1980     Years since quittin.1     Smokeless tobacco: Never Used   Substance and Sexual Activity     Alcohol use: No     Drug use: No     Sexual activity: None   Lifestyle     Physical activity     Days per week: None     Minutes per session: None     Stress: None   Relationships     Social connections     Talks on phone: None     Gets together: None     Attends [employer] service: None     Active member of club or organization: None     Attends meetings of clubs or organizations: None     Relationship status: None     Intimate partner violence     Fear of current or ex partner: None     Emotionally abused: None     Physically abused: None     Forced sexual activity: None   Other Topics Concern     None   Social History Narrative     None     Patient denies smoking, no significant alcohol intake, denies illicit drugs use [id number]      Family History:     Family History   Problem Relation Age of Onset     Diabetes Brother      Reviewed and not felt to be contributory.        Home Medications:     Prior to Admission Medications   Prescriptions Last Dose Informant Patient Reported? Taking?   MAGNESIUM CITRATE PO prn Nursing Home Yes Yes   Sig: Take 375 mg by mouth daily  as needed (constipation)   QUEtiapine (SEROQUEL) 100 MG tablet  at 2000 [facility] Yes Yes   Sig: Take 150 mg by mouth every evening    QUEtiapine (SEROQUEL) 50 MG tablet  Nursing Home Yes Yes   Sig: Take 50 mg by mouth every morning hallucinations   Vitamin D3 (CHOLECALCIFEROL) 125 MCG (5000 UT) tablet  at 0800 [facility] Yes Yes   Sig: Take 125 mcg by mouth daily   atenolol (TENORMIN) 50 MG tablet  at 0800 [facility] Yes Yes   Sig: Take 150 mg by mouth daily    buPROPion (WELLBUTRIN XL) 150 MG 24 hr tablet  at 0800 [facility] Yes Yes   Sig: Take 450 mg by mouth every morning    clotrimazole (LOTRIMIN) 1 % external cream  Nursing Home Yes Yes   Sig: Apply topically 2 times daily candidial intertrigo   clotrimazole-betamethasone (LOTRISONE) 1-0.05 % external cream  Nursing Home Yes Yes   Sig: Apply topically 2 times daily candinitis   cycloSPORINE (RESTASIS) 0.05 % ophthalmic emulsion  Nursing Home Yes Yes   Sig: Place 1 drop Into the left eye 2 times daily   fluconazole (DIFLUCAN) 150 MG tablet  Nursing Home Yes Yes   Sig: Take 150 mg by mouth once a week On Wednesdays.  Ending 11/25/2020   furosemide (LASIX) 40 MG tablet  Nursing Home Yes Yes   Sig: Take 40 mg by mouth daily   levothyroxine (SYNTHROID/LEVOTHROID) 125 MCG tablet  at 0800 [facility] Yes Yes   Sig: Take 125 mcg by mouth every morning (before breakfast)   melatonin 3 MG tablet  at 2000 [facility] Yes Yes   Sig: Take 1 mg by mouth At Bedtime   multivitamin w/minerals (THERA-VIT-M) tablet  at 0800  Yes Yes   Sig: Take 1 tablet by mouth daily   nystatin (MYCOSTATIN) 284520 UNIT/GM external powder  Nursing Home Yes Yes   Sig: Apply topically 2 times daily Skin breakdown   sertraline (ZOLOFT) 100 MG tablet  at 0800 [facility] Yes Yes   Sig: Take 200 mg by mouth daily   triamcinolone (KENALOG) 0.1 % external cream  Nursing Home Yes Yes   Sig: Apply topically 3 times daily Candidal intertrigo      Facility-Administered Medications: None           Allergy:     Allergies   Allergen Reactions     Ciprofloxacin      Sulfa Drugs           Inpatient Medications:   Scheduled Meds:    [START ON 11/11/2020] atenolol  150 mg Oral Daily     [START ON 11/11/2020] buPROPion  450 mg Oral QAM     carboxymethylcellulose PF  1 drop Left Eye BID     clotrimazole   Topical BID     clotrimazole-betamethasone   Topical BID     fluconazole  150 mg Oral Weekly     [START ON 11/11/2020] furosemide  40 mg Oral Daily     [START ON 11/11/2020] levothyroxine  125 mcg Oral QAM AC     multivitamin w/minerals  1 tablet Oral Daily     nystatin   Topical BID     polyethylene glycol  17 g Oral Daily     senna-docusate  1 tablet Oral BID    Or     senna-docusate  2 tablet Oral BID     [START ON 11/11/2020] sertraline  200 mg Oral Daily     triamcinolone   Topical TID     [START ON 11/11/2020] Vitamin D3  125 mcg Oral Daily     PRN Meds: acetaminophen, acetaminophen, melatonin, ondansetron **OR** ondansetron        Review of Systems    The Review of Systems is negative other than noted in the HPI    CONSTITUTIONAL: negative for fever, chills, change in weight  INTEGUMENTARY/SKIN: some skin fungus problems   ENT/MOUTH: no sore throat, new sinus pain or nasal drainage, no neck mass noted  RESP: No pleuretic pain, No cough, no hemoptysis, No SOB   CV: negative for chest pain, palpitations or peripheral edema  GI: no nausea, vomiting, change in stools  : no dysuria or hematuria  MUSCULOSKELETAL: no myalgias, arthralgias or join efffusion  ENDOCRINE: no history of polyuria, polydyspsia or symptoms of thyroid dysfunction  PSYCHIATRIC: no change in mood stable  LYMPHATIC: no new lymphadenopathy  HEME: no bleeding or easy bruisability  NEURO: see HPI       Physical Exam:   Physical Exam   Vitals:  Height:Data Unavailable  Weight:0 lbs 0 oz   Temp: 97.6  F (36.4  C) Temp src: Oral BP: (!) 162/69 Pulse: 53   Resp: 30 SpO2: 100 %      General Appearance:  No acute distress, sleepy, bruises on legs,  left arm immobilized  Neuro:       Mental Status Exam:    Lethargic, but arousable, oriented to place, own address, month, year election results.  Moderate monotone speech, Language intact, sometimes difficult to understand.         Cranial Nerves:   Vision/fields intact, EOMI, Facial strength intact, reduced facial expression, Tongue and jaw midline.  Shoulder elevatin symmetric.           Motor:   Antigravity strength and bulk nl x4       Reflexes:  Unable to test       Sensory:  Unable to test                   Coordination:   AMR of finger movement of low amplitude and regularity.  Mild in feet       Gait:  Unable due to concern for fall risk.   Neck: no nuchal rigidity, normal thyroid. No carotid bruits.    Cardiovascular: Regular rate and rhythm, no m/r/g  Extremities: No clubbing, no cyanosis, no edema       Data:   ROUTINE IP LABS   CBC RESULTS:     Recent Labs   Lab 11/10/20  1057   WBC 5.3   RBC 3.67*   HGB 12.0   HCT 36.8   *     Basic Metabolic Panel:   Recent Labs   Lab Test 11/10/20  1057 12/28/19  1222    139   POTASSIUM 3.4 4.0   CHLORIDE 104 104   CO2 31 29   BUN 26 26   CR 1.22* 1.03   GLC 99 94   PRASHANTH 9.2 9.4     Liver panel:  Recent Labs   Lab Test 11/10/20  1057 12/28/19  1222   PROTTOTAL 7.3 7.3   ALBUMIN 3.5 3.9   BILITOTAL 0.6 0.7   ALKPHOS 82 97   AST 16 39   ALT 20 33     INR:  Recent Labs   Lab Test 12/28/19  1222   INR 0.97      Thyroid Panel:  Recent Labs   Lab Test 12/28/19  1222   TSH 8.63*   T4 0.87      Troponin I:   Recent Labs   Lab Test 11/10/20  1057 12/28/19  1222   TROPI <0.015 <0.015   B12 checked elsewere 2019 was normal.            IMAGING:   All imaging studies were reviewed personally    CT OF THE HEAD WITHOUT CONTRAST 11/10/2020 11:21 AM      COMPARISON: Head CT 8/18/2020     HISTORY: Fall, right forehead trauma, AMS with nonfocal exam.     TECHNIQUE: 5 mm thick axial CT images of the head were acquired  without IV contrast material.     FINDINGS: There is  moderate diffuse cerebral volume loss. There are  subtle patchy areas of decreased density in the cerebral white matter  bilaterally that are consistent with sequela of chronic small vessel  ischemic disease.     The ventricles and basal cisterns are within normal limits in  configuration given the degree of cerebral volume loss. There is no  midline shift. There are no extra-axial fluid collections.     No intracranial hemorrhage, mass or recent infarct.     The visualized paranasal sinuses are well-aerated. There is no  mastoiditis. There are no fractures of the visualized bones.                                                                      IMPRESSION: Diffuse cerebral volume loss and cerebral white matter  changes consistent with chronic small vessel ischemic disease. No  evidence for acute intracranial pathology    MR HEAD BRAIN WO  LOCATION: [facility] IMAGING  DATE/TIME: 4/5/2019 1:41 PM    INDICATION: Dementia. Abnormal comparison head MRI.  COMPARISON: Head MRI 01/14/2005  TECHNIQUE: Routine multiplanar multisequence head MRI without intravenous  contrast.    FINDINGS:  INTRACRANIAL CONTENTS: No acute or subacute infarct. No mass, acute hemorrhage,  or extra-axial fluid collections. Scattered nonspecific T2/FLAIR  hyperintensities within the cerebral white matter most consistent with mild  chronic microvascular ischemic change. Mild supratentorial generalized volume  loss. Moderate cerebellar volume loss, most pronounced in the superior vermis.  This pattern is unchanged. No hydrocephalus. Normal position of the cerebellar  tonsils.     SELLA: No significant abnormality accounting for technique.    OSSEOUS STRUCTURES/SOFT TISSUES: No aggressive osseous lesion involving the  calvarium, skull base, or visualized upper cervical spine. The major  intracranial vascular flow voids are maintained.     ORBITS: No significant abnormality accounting for technique.     SINUSES/MASTOIDS: No  significant paranasal sinus mucosal disease. No significant  middle ear or mastoid effusion.     CONCLUSION:  1.  No change.  2.  No mass, hemorrhage or stroke.  3.  Mild presumed chronic small vessel ischemic change.  4.  Moderate cerebellar volume loss, most pronounced in the superior vermis.  5.  Mild supratentorial volume loss      This was in part a telemedicine visit that was performed with the originating site at [facility] in the distant site [facility].  Verbal consent was given to participate in the video visit using hospital-based poly-com technology.  This occurred during the coronavirus public health emergency and was requested due to contact concerns.  The patient was aware of the nature of telemedicine visits and that I would evaluate the patient and make diagnostic and treatment recommendations based on my evaluation. The visits are not being recorded and personal health information is protected.  Our team would provide follow-up care in person if and when the patient needs it.  Patient identification was verified before the start of the encounter.  The video conference took place between [id number]  Total time spent with visit related to care 60minutes.       Ifeoma Breaux M.D.  [facility]  Office [phone]

## 2020-11-10 NOTE — ED NOTES
[facility]  ED Nurse Handoff Report    ED Chief complaint: Altered Mental Status and Fall      ED Diagnosis:   Final diagnoses:   None       Code Status: Not addressed by ED MD.    Allergies:   Allergies   Allergen Reactions     Ciprofloxacin      Sulfa Drugs        Patient Story: Patient comes in from Assisted living facility after having 6 falls yesterday. There are also concerns that she may have taken 3 doses of her Seroquel at once.  Focused Assessment:  Patient was altered upon arrival to ED. Patient has some confusion to location, can states month and current president and has some recall from last night. Patient has pain to left wrist and right knee after the fall. Patient has some abrasions to her forehead.  Treatments and/or interventions provided: Patient being monitored with tele and VS. Patient's tele is sinus rhythm without ectopy.  Patient's response to treatments and/or interventions: Patient remains stable    To be done/followed up on inpatient unit:  Continue to monitor.    Does this patient have any cognitive concerns?: Forgetful    Activity level - Baseline/Home:  Independent  Activity Level - Current:   Has not gotten up while in the ED.    Patient's Preferred language: English   Needed?: No    Isolation: None  Infection: Not Applicable  Patient tested for COVID 19 prior to admission: YES  Bariatric?: No    Vital Signs:   Vitals:    11/10/20 1100   BP: (!) 140/65   Pulse: 58   Resp: 18   SpO2: 99%       Cardiac Rhythm:     Was the PSS-3 completed:   Yes  What interventions are required if any?               Family Comments: Not in attendance  OBS brochure/video discussed/provided to patient/family: No              Name of person given brochure if not patient: N/A              Relationship to patient: N/A    For the majority of the shift this patient's behavior was Green.   Behavioral interventions performed were N/A.    ED NURSE PHONE NUMBER: [phone]

## 2020-11-10 NOTE — PROGRESS NOTES
RECEIVING UNIT ED HANDOFF REVIEW    ED Nurse Handoff Report was reviewed by: Tita Vora RN on November 10, 2020 at 3:09 PM

## 2020-11-10 NOTE — PHARMACY-ADMISSION MEDICATION HISTORY
Pharmacy Medication History  Admission medication history interview status for the 11/10/2020  admission is complete. See EPIC admission navigator for prior to admission medications       Medication history sources: Med list from [facility]. Pt unable to give an accurate history. Unsure of last doses taken.  She takes her pills herself.  Location of interview: paper med list  Medication history source reliability: Good  Adherence assessment: unsure    Significant changes made to the medication list:  Changed: atenolol dose, levothyroxine dose  Removed: Vitamin c, glutamate, acetylcystine  Added furosemide, multivitamin, nystatin powder + cream, fluconazole, restasis, melatonin, vitamin D, seroquel,       Additional medication history information:   n/a    Medication reconciliation completed by provider prior to medication history? No    Time spent in this activity: 30 mins        Prior to Admission medications    Medication Sig Last Dose Taking? Auth Provider   atenolol (TENORMIN) 50 MG tablet Take 150 mg by mouth daily   at 0800 Yes Reported, Patient   buPROPion (WELLBUTRIN XL) 150 MG 24 hr tablet Take 450 mg by mouth every morning   at 0800 Yes Reported, Patient   clotrimazole (LOTRIMIN) 1 % external cream Apply topically 2 times daily candidial intertrigo  Yes Unknown, Entered By History   clotrimazole-betamethasone (LOTRISONE) 1-0.05 % external cream Apply topically 2 times daily candinitis  Yes Reported, Patient   cycloSPORINE (RESTASIS) 0.05 % ophthalmic emulsion Place 1 drop Into the left eye 2 times daily  Yes Unknown, Entered By History   fluconazole (DIFLUCAN) 150 MG tablet Take 150 mg by mouth once a week On Wednesdays.  Ending 11/25/2020  Yes Unknown, Entered By History   furosemide (LASIX) 40 MG tablet Take 40 mg by mouth daily  Yes Unknown, Entered By History   levothyroxine (SYNTHROID/LEVOTHROID) 125 MCG tablet Take 125 mcg by mouth every morning (before breakfast)  at 0800 Yes  Unknown, Entered By History   MAGNESIUM CITRATE PO Take 375 mg by mouth daily as needed (constipation) prn Yes Unknown, Entered By History   melatonin 3 MG tablet Take 1 mg by mouth At Bedtime  at 2000 Yes Unknown, Entered By History   multivitamin w/minerals (THERA-VIT-M) tablet Take 1 tablet by mouth daily  at 0800 Yes Unknown, Entered By History   nystatin (MYCOSTATIN) 841924 UNIT/GM external powder Apply topically 2 times daily Skin breakdown  Yes Unknown, Entered By History   QUEtiapine (SEROQUEL) 100 MG tablet Take 150 mg by mouth every evening   at 2000 Yes Unknown, Entered By History   QUEtiapine (SEROQUEL) 50 MG tablet Take 50 mg by mouth every morning hallucinations  Yes Unknown, Entered By History   sertraline (ZOLOFT) 100 MG tablet Take 200 mg by mouth daily  at 0800 Yes Reported, Patient   triamcinolone (KENALOG) 0.1 % external cream Apply topically 3 times daily Candidal intertrigo  Yes Unknown, Entered By History   Vitamin D3 (CHOLECALCIFEROL) 125 MCG (5000 UT) tablet Take 125 mcg by mouth daily  at 0800 Yes Unknown, Entered By History     Corinna Trujillo, PharmD

## 2020-11-10 NOTE — ED PROVIDER NOTES
History     Chief Complaint:  Altered Mental Status and Fall      HPI   history limited as patient is a poor historian  Fern Simons is a 75 year old female who presents by EMS from an independent living facility with altered mental status and evaluation after a fall.  Per report, the patient fell out of her recliner last night. She states that she was able to get up off the floor with help from staff. This morning staff checked on her again per routine and found her confused and noticed that 3 nights worth of her evening Seroquel were gone. The patient states that she hurts everywhere especially her left elbow and right knee. Patient normally uses a walker to ambulate.  She has apparently fallen almost daily during the past week, the circumstances of which are unclear.    Allergies:  Ciprofloxacin  Sulfa Drugs    Medications:    Seroquel  Tenormin  Wellbutrin    Past Medical History:    Depression  Hypertension  Thyroid disease  Candidal intertrigo  Dystrophic nail  AK  ADD  Esophageal reflux  Bulimia    Past Surgical History:    Cholecystectomy  Tonsillectomy  Colonoscopy  Hernia repair  Toenail removal  Blepharoplasty    Family History:    Father: Diabetes, hypertension  Mother: Heart disease, hypertension    Social History:  The patient was accompanied to the ED via EMS.  Smoking Status: Former Smoker   Quit in 1980  Smokeless Tobacco: Never Used  Alcohol Use: Negative  Drug Use: Negative  PCP: [facility], [facility]       Review of Systems   Unable to perform ROS: Mental status change     Physical Exam     Patient Vitals for the past 24 hrs:   BP Temp Temp src Pulse Resp SpO2   11/10/20 1236 (!) 153/73 97.6  F (36.4  C) Oral 54 14 99 %   11/10/20 1100 (!) 140/65 -- -- 58 18 99 %     Physical Exam  General: Woman recumbent in room 8  HENT: face nontender with full painless ROM mandible, no bony deformity, OP clear, no difficulty controlling secretions, skull minimally tender to R forehead with mild  localized swelling  Eyes: PERRL without proptosis  CV:  regular rhythm, cap refill normal in all extremities  Resp: normal effort, speaks in full phrases, no stridor  GI: abdomen soft, nontender, no guarding  MSK:  Cervical spine: no midline tenderness, FROM  Thoracic spine: no midline tenderness, no CVAT  Lumbar spine: no midline tenderness  Chest wall: nontender without crepitus  Pelvis stable  Extremities: Moderate tenderness to left wrist and forearm with swelling, also some swelling and moderate tenderness just distal to the right anterior knee, with extensor mechanism intact.  Remainder of extremities are nontender  Skin:   No abrasion  Ecchymosis present to left wrist and right knee, and less so to the right forehead  No laceration  Neuro: awake, slightly drowsy, responds to basic questions and commands but poor historian, no nuchal rigidity,  equal, lift both legs off bed   Psych: cooperative    Emergency Department Course     ECG:  Indication: Altered Mental Status, weakness  Time: 1100  Vent. Rate 59 bpm. ND interval *. QRS duration 82. QT/QTc 462/457. P-R-T axis * 40 43. Suspect sinus rhythm. Slight artifact. Read time: 1100     Imaging:  Radiology findings were communicated with the patient, family and Admitting MD who voiced understanding of the findings.    XR Knee, Right G/E 3 views:   No fracture identified. As per radiology.    XR Tibia & Fibula, Right G/E 2 views:   No fracture identified As per radiology.    XR Wrist/Radius/Ulna, Left G/E 3 views:   Distal radius fracture. There is minimal displacement at  the dorsal aspect of the fracture. As per radiology.    XR Chest, G/E 2 views:   Mild cardiomegaly. Otherwise negative chest. As per radiology.    CT Head without contrast:   Diffuse cerebral volume loss and cerebral white matter  changes consistent with chronic small vessel ischemic disease. No  evidence for acute intracranial pathology. As per radiology.    Laboratory:  Laboratory  findings were communicated with the patient, family and Admitting MD who voiced understanding of the findings.    CBC: WBC: 5.3, HGB: 12.0, PLT: 126 (L)    CMP: Glucose 99, Anion Gap: 2 (L), Creatinine: 1.22 (H), GFR: 43 (L), o/w WNL    1057 Troponin: <0.015    Alcohol Ethyl: <0.01    Salicylate Level: <2    Acetaminophen Level: <2    UA with Microscopic: Squamous Epithelial: 2 (H), Mucous: Present (A) o/w Negative    COVID-19 Virus (Coronavirus), PCR NP Swab: Pending      Emergency Department Course:  Past medical records, nursing notes, and vitals reviewed.    1040 I performed an exam of the patient as documented above.     EKG obtained in the ED, see results above.     IV was inserted and blood was drawn for laboratory testing, results above.    The patient provided a urine sample here in the emergency department. This was sent for laboratory testing, findings above.    The patient was sent for a Chest, Radius/Ulna, Wrist, Knee, and Tibia/Fibula X-ray and Head CT while in the emergency department, results above.     1247 I attempted to call patient's brother, Johny, regarding the patient's presentation here in the emergency department. Left a voicemail.     1249 Johny called back and confirmed medication list and added history.    1257 I consulted with Dr. Vera, Hospitalist, regarding the patient's history and presentation here in the emergency department.    1305 I rechecked the patient and discussed the results of her workup thus far.     Findings and plan explained to the Patient and brother who consents to admission. Discussed the patient with Dr. Vera, who will admit the patient to a Observation bed for further monitoring, evaluation, and treatment.    I personally reviewed the laboratory and imaging results with the Patient and answered all related questions prior to admission.     Impression & Plan     Covid-19  Fern Simons was evaluated during a global COVID-19 pandemic, which necessitated  consideration that the patient might be at risk for infection with the SARS-CoV-2 virus that causes COVID-19.   Applicable protocols for evaluation were followed during the patient's care.   COVID-19 was considered as part of the patient's evaluation. The plan for testing is:  a test was obtained during this visit.    Medical Decision Making:  In light of available information, it seems most likely that accidental overdose on Seroquel is attributing to her altered mentation, though a variety of other etiologies were considered, investigated, and not definitively ruled out, including intracranial hemorrhage, infection, metabolic abnormality, and others.  From a trauma standpoint, the only significant injury detected at this time is a distal radius fracture, which is not amenable to reduction and does not require immediate orthopedic consultation though she was placed in a velcro splint and will need a tertiary trauma exam later.  I spoke with the patient's family who confirmed that she is DNR/DNI and agree with hospitalization for close monitoring and further care.    Diagnosis:    ICD-10-CM    1. Altered mental status, unspecified altered mental status type  R41.82     possible medication effect   2. Recurrent falls  R29.6    3. Renal insufficiency  N28.9    4. Closed fracture of distal end of left radius, unspecified fracture morphology, initial encounter  S52.502A      Disposition:  Admitted to Dr. Vera.    Scribe Disclosure:  I, Eusebio Bell, am serving as a scribe at 10:53 AM on 11/10/2020 to document services personally performed by Baldo Rubalcava MD based on my observations and the provider's statements to me.     This note was completed in part using Dragon voice recognition software. Although reviewed after completion, some word and grammatical errors may occur.       Baldo Rubalcava MD  11/10/20 7507

## 2020-11-10 NOTE — TREATMENT PLAN
Consult received    Patient images reviewed    Left distal radius fracture, non-displaced  Non-operative treatment at this time  Will check splint and formal consult in AM  May have normal diet    Kenya MCGILL

## 2020-11-11 ENCOUNTER — APPOINTMENT (OUTPATIENT)
Dept: PHYSICAL THERAPY | Facility: CLINIC | Age: 75
End: 2020-11-11
Attending: PHYSICIAN ASSISTANT
Payer: COMMERCIAL

## 2020-11-11 ENCOUNTER — DOCUMENTATION ONLY (OUTPATIENT)
Dept: OTHER | Facility: CLINIC | Age: 75
End: 2020-11-11

## 2020-11-11 LAB
ALBUMIN SERPL-MCNC: 3.2 G/DL (ref 3.4–5)
ALP SERPL-CCNC: 83 U/L (ref 40–150)
ALT SERPL W P-5'-P-CCNC: 22 U/L (ref 0–50)
AST SERPL W P-5'-P-CCNC: 17 U/L (ref 0–45)
BASOPHILS # BLD AUTO: 0 10E9/L (ref 0–0.2)
BASOPHILS NFR BLD AUTO: 0.2 %
BILIRUB DIRECT SERPL-MCNC: 0.1 MG/DL (ref 0–0.2)
BILIRUB SERPL-MCNC: 0.9 MG/DL (ref 0.2–1.3)
DIFFERENTIAL METHOD BLD: ABNORMAL
EOSINOPHIL # BLD AUTO: 0 10E9/L (ref 0–0.7)
EOSINOPHIL NFR BLD AUTO: 0.3 %
ERYTHROCYTE [DISTWIDTH] IN BLOOD BY AUTOMATED COUNT: 13.7 % (ref 10–15)
HCT VFR BLD AUTO: 36.9 % (ref 35–47)
HGB BLD-MCNC: 12.1 G/DL (ref 11.7–15.7)
IMM GRANULOCYTES # BLD: 0 10E9/L (ref 0–0.4)
IMM GRANULOCYTES NFR BLD: 0.2 %
LYMPHOCYTES # BLD AUTO: 0.9 10E9/L (ref 0.8–5.3)
LYMPHOCYTES NFR BLD AUTO: 15.9 %
MCH RBC QN AUTO: 32.6 PG (ref 26.5–33)
MCHC RBC AUTO-ENTMCNC: 32.8 G/DL (ref 31.5–36.5)
MCV RBC AUTO: 100 FL (ref 78–100)
MONOCYTES # BLD AUTO: 0.5 10E9/L (ref 0–1.3)
MONOCYTES NFR BLD AUTO: 8.3 %
NEUTROPHILS # BLD AUTO: 4.4 10E9/L (ref 1.6–8.3)
NEUTROPHILS NFR BLD AUTO: 75.1 %
NRBC # BLD AUTO: 0 10*3/UL
NRBC BLD AUTO-RTO: 0 /100
PLATELET # BLD AUTO: 113 10E9/L (ref 150–450)
PROT SERPL-MCNC: 6.8 G/DL (ref 6.8–8.8)
RBC # BLD AUTO: 3.71 10E12/L (ref 3.8–5.2)
SARS-COV-2 RNA SPEC QL NAA+PROBE: NOT DETECTED
SPECIMEN SOURCE: NORMAL
WBC # BLD AUTO: 5.8 10E9/L (ref 4–11)

## 2020-11-11 PROCEDURE — 250N000013 HC RX MED GY IP 250 OP 250 PS 637: Performed by: HOSPITALIST

## 2020-11-11 PROCEDURE — G0378 HOSPITAL OBSERVATION PER HR: HCPCS

## 2020-11-11 PROCEDURE — 99207 PR CDG-CODE CATEGORY CHANGED: CPT | Performed by: INTERNAL MEDICINE

## 2020-11-11 PROCEDURE — 36415 COLL VENOUS BLD VENIPUNCTURE: CPT | Performed by: HOSPITALIST

## 2020-11-11 PROCEDURE — 97161 PT EVAL LOW COMPLEX 20 MIN: CPT | Mod: GP | Performed by: PHYSICAL THERAPIST

## 2020-11-11 PROCEDURE — 97116 GAIT TRAINING THERAPY: CPT | Mod: GP | Performed by: PHYSICAL THERAPIST

## 2020-11-11 PROCEDURE — 97530 THERAPEUTIC ACTIVITIES: CPT | Mod: GP | Performed by: PHYSICAL THERAPIST

## 2020-11-11 PROCEDURE — 85025 COMPLETE CBC W/AUTO DIFF WBC: CPT | Performed by: HOSPITALIST

## 2020-11-11 PROCEDURE — 99226 PR SUBSEQUENT OBSERVATION CARE,LEVEL III: CPT | Performed by: INTERNAL MEDICINE

## 2020-11-11 PROCEDURE — 80076 HEPATIC FUNCTION PANEL: CPT | Performed by: HOSPITALIST

## 2020-11-11 PROCEDURE — 250N000013 HC RX MED GY IP 250 OP 250 PS 637: Performed by: PSYCHIATRY & NEUROLOGY

## 2020-11-11 RX ORDER — DONEPEZIL HYDROCHLORIDE 5 MG/1
5 TABLET, FILM COATED ORAL AT BEDTIME
Status: DISCONTINUED | OUTPATIENT
Start: 2020-11-11 | End: 2020-11-20 | Stop reason: HOSPADM

## 2020-11-11 RX ORDER — NALOXONE HYDROCHLORIDE 0.4 MG/ML
.1-.4 INJECTION, SOLUTION INTRAMUSCULAR; INTRAVENOUS; SUBCUTANEOUS
Status: DISCONTINUED | OUTPATIENT
Start: 2020-11-11 | End: 2020-11-20 | Stop reason: HOSPADM

## 2020-11-11 RX ORDER — TRAMADOL HYDROCHLORIDE 50 MG/1
50 TABLET ORAL EVERY 6 HOURS PRN
Status: DISCONTINUED | OUTPATIENT
Start: 2020-11-11 | End: 2020-11-20 | Stop reason: HOSPADM

## 2020-11-11 RX ADMIN — LEVOTHYROXINE SODIUM 125 MCG: 100 TABLET ORAL at 06:38

## 2020-11-11 RX ADMIN — BUPROPION HYDROCHLORIDE 450 MG: 150 TABLET, EXTENDED RELEASE ORAL at 08:50

## 2020-11-11 RX ADMIN — TRIAMCINOLONE ACETONIDE: 1 CREAM TOPICAL at 08:54

## 2020-11-11 RX ADMIN — ATENOLOL 150 MG: 50 TABLET ORAL at 08:49

## 2020-11-11 RX ADMIN — MICONAZOLE NITRATE: 20 POWDER TOPICAL at 21:41

## 2020-11-11 RX ADMIN — TRIAMCINOLONE ACETONIDE: 1 CREAM TOPICAL at 21:41

## 2020-11-11 RX ADMIN — POLYETHYLENE GLYCOL 3350 17 G: 17 POWDER, FOR SOLUTION ORAL at 08:50

## 2020-11-11 RX ADMIN — CARBOXYMETHYLCELLULOSE SODIUM 1 DROP: 10 GEL OPHTHALMIC at 08:51

## 2020-11-11 RX ADMIN — ACETAMINOPHEN 650 MG: 325 TABLET, FILM COATED ORAL at 06:38

## 2020-11-11 RX ADMIN — DOCUSATE SODIUM 50 MG AND SENNOSIDES 8.6 MG 1 TABLET: 8.6; 5 TABLET, FILM COATED ORAL at 08:50

## 2020-11-11 RX ADMIN — FUROSEMIDE 40 MG: 40 TABLET ORAL at 08:50

## 2020-11-11 RX ADMIN — MULTIPLE VITAMINS W/ MINERALS TAB 1 TABLET: TAB at 08:49

## 2020-11-11 RX ADMIN — CLOTRIMAZOLE: 1 CREAM TOPICAL at 21:42

## 2020-11-11 RX ADMIN — CLOTRIMAZOLE: 1 CREAM TOPICAL at 08:53

## 2020-11-11 RX ADMIN — SERTRALINE HYDROCHLORIDE 200 MG: 100 TABLET ORAL at 08:50

## 2020-11-11 RX ADMIN — FLUCONAZOLE 150 MG: 150 TABLET ORAL at 09:32

## 2020-11-11 RX ADMIN — CARBOXYMETHYLCELLULOSE SODIUM 1 DROP: 10 GEL OPHTHALMIC at 21:41

## 2020-11-11 RX ADMIN — CLOTRIMAZOLE AND BETAMETHASONE DIPROPIONATE: 10; .5 CREAM TOPICAL at 21:41

## 2020-11-11 RX ADMIN — DOCUSATE SODIUM 50 MG AND SENNOSIDES 8.6 MG 1 TABLET: 8.6; 5 TABLET, FILM COATED ORAL at 21:40

## 2020-11-11 RX ADMIN — MICONAZOLE NITRATE: 20 POWDER TOPICAL at 08:54

## 2020-11-11 RX ADMIN — CLOTRIMAZOLE AND BETAMETHASONE DIPROPIONATE: 10; .5 CREAM TOPICAL at 08:53

## 2020-11-11 RX ADMIN — DONEPEZIL HYDROCHLORIDE 5 MG: 5 TABLET, FILM COATED ORAL at 21:40

## 2020-11-11 RX ADMIN — TRIAMCINOLONE ACETONIDE: 1 CREAM TOPICAL at 17:25

## 2020-11-11 RX ADMIN — CHOLECALCIFEROL TAB 125 MCG (5000 UNIT) 125 MCG: 125 TAB at 08:51

## 2020-11-11 NOTE — PROGRESS NOTES
11/11/20 1343   Quick Adds   Type of Visit Initial PT Evaluation   Living Environment   People in home alone   Current Living Arrangements independent living facility  ([facility] )   Home Accessibility no concerns   Transportation Anticipated family or friend will provide   Living Environment Comments Pt reports she receives assist for bathing.    Self-Care   Usual Activity Tolerance good   Current Activity Tolerance fair   Regular Exercise No   Equipment Currently Used at Home walker, rolling   Activity/Exercise/Self-Care Comment Pt owns 4WW.    Disability/Function   Fall history within last six months yes   Number of times patient has fallen within last six months 7   General Information   Onset of Illness/Injury or Date of Surgery 11/10/20   Referring Physician Kev Vera, DO   Patient/Family Therapy Goals Statement (PT) None stated.    Pertinent History of Current Problem (include personal factors and/or comorbidities that impact the POC) 74 y/o female admitted after a fall, noted to have acute metabolic encephalopathy secondary to seroquel overdose and L distal radial fracture. PMH including mild cognitive impairment, HTN, hypothyroidism, obesity, and depression.    Existing Precautions/Restrictions fall   Weight-Bearing Status - LUE nonweight-bearing  (Per Ortho PA, pt can use platform.)   General Observations Pt in supine upon arrival of therapist.    Cognition   Orientation Status (Cognition) place;person   Affect/Mental Status (Cognition) confused   Follows Commands (Cognition) 25-49% accuracy   Safety Deficit (Cognition)   (Impaired safety awareness. )   Pain Assessment   Patient Currently in Pain No   Integumentary/Edema   Integumentary/Edema Comments Noted abrasions/bruising on R side of face and forehead.    Posture    Posture Comments Noted forward head and shoulder posture upon sitting EOB and standing at FWW.    Range of Motion (ROM)   ROM Comment B LEs WFL.     Strength   Strength Comments Not formally assessed, pt demonstrates at least 3/5 grossly in B LEs with functional mobility.    Bed Mobility   Comment (Bed Mobility) Supine-sit with SBA.    Transfers   Transfer Safety Comments Sit <> stand with L platform walker and CGA.    Gait/Stairs (Locomotion)   Comment (Gait/Stairs) Pt amb 10' with L platform walker and CGA.    Balance   Balance Comments Noted good seated balance and fair standing balance at walker.    Sensory Examination   Sensory Perception Comments Pt denied numbness/tingling in B LEs.    Clinical Impression   Criteria for Skilled Therapeutic Intervention yes, treatment indicated   PT Diagnosis (PT) Difficulty with gait.    Influenced by the following impairments Generalized weakness, Decreased activity tolerance, Impaired balance   Functional limitations due to impairments Limited functional mobility requiring AD and assist.    Clinical Presentation Stable/Uncomplicated   Clinical Presentation Rationale Based on PMH, current presentation, and social support.    Clinical Decision Making (Complexity) low complexity   Therapy Frequency (PT) 5x/week   Predicted Duration of Therapy Intervention (days/wks) 3 days   Planned Therapy Interventions (PT) bed mobility training;gait training;strengthening;transfer training   Risk & Benefits of therapy have been explained patient   PT Discharge Planning    PT Discharge Recommendation (DC Rec) Transitional Care Facility   PT Rationale for DC Rec Pt is below baseline and present as a fall risk with frequent falls prior to admission. Pt will benefit from continued skilled PT intervention in order to progress independence and safety with functional mobility.    Total Evaluation Time   Total Evaluation Time (Minutes) 5

## 2020-11-11 NOTE — CONSULTS
"Care Management Initial Consult    General Information  Assessment completed with: Care Team Member, [facility] -[phone]     Primary Care Provider verified and updated as needed: Yes(Adeola Quezada PA-C phone [phone])   Readmission within the last 30 days:        Reason for Consult: discharge planning \"From independent side of [facility] may need more services\"  Advance Care Planning:    PCP clinic notes brother is \"POA\" (POA typically means financial; thus will need to obtain and verify documents to see if has Health Care Agent); jhon brother is next of kin  *[facility] will fax over the Short Form document they have on file*   + Addendum: POA (financial) document received, no HCD (healthcare) document received     Communication Assessment  Patient's communication style: spoken language (English or Bilingual)    Hearing Difficulty or Deaf: no   Wear Glasses or Blind: no    Cognitive  Cognitive/Neuro/Behavioral: .WDL except  Level of Consciousness: alert  Arousal Level: opens eyes spontaneously  Orientation: disoriented to;time  Mood/Behavior: calm;cooperative  Best Language: 0 - No aphasia  Speech: clear    Living Environment:   People in home: facility resident  pt is in studio apartment   Current living Arrangements: independent living facility      Able to return to prior arrangements: yes  Living Arrangement Comments: nursing says pt is welcome back at their facility and they can increase services as needed; however historically pt has refused increase due to cost     Family/Social Support:  Care provided by:  mostly independent   Provides care for: no one  Marital Status: Single   Support system: Sibling(s)   brother       Description of Support System: Supportive;Involved       Current Resources:   Community Services: [facility] provides shower assist every other week   Skilled Home Care Services: Home Care(was recently discharged from PT/OT with [facility] )  Equipment " "currently used at home: grab bar, toilet(built in to bathroom at [facility])  Supplies currently used at home:      Employment/Financial:  Employment Status:  Not asked, presumed not working     Financial Concerns: other (see comments)   Finance Comments: reluctant to add services at [facility] due to cost     Lifestyle & Psychosocial Needs:   Socioeconomic History     Marital status: Single     Spouse name: Not on file     Number of children: Not on file     Years of education: Not on file     Highest education level: Not on file     Tobacco Use     Smoking status: Former Smoker     Quit date: 9/15/1980     Years since quittin.1     Smokeless tobacco: Never Used   Substance and Sexual Activity     Alcohol use: No     Drug use: No     Functional Status:  Prior to admission patient needed assistance:   Independent; however was having increase in falls (6 falls in 6 days); had recently been discharged from [place] PT/OT and then started falling.  Pt was getting \"medication setup\" but evidently was taking meds incorrectly; in discussion with brother, [facility] plans to change service to \"medication administration.\"      Mental Health Status:   To be assessed by neurology / psychiatry       Chemical Dependency Status:    Not asked          Values/Beliefs:  Spiritual, Cultural Beliefs, Yarsani Practices, Values that affect care:            Values/Beliefs Comment: Buddhism    Additional Information:  Nursing at [facility] states pt is welcome back, they are able to increase services (e.g. toileting, AM/PM cares, etc) but historically money has been an issue for pt.  Nursing is typically staffed Monday thru Friday, but they do have on-call nursing available on the weekend and they do allow weekend admissions, but \"the earlier in the day, the better.\"  Will await therapy recommendations to see what level of care is recommended--Ortho provider notes platform walker will be needed, this would be new DME for patient.  " Meanwhile ILF/long term nurse will fax over the Short Form directive they have on file for hospital records.      Dejah Aquino RN, BSN, PHN  [facility]  Inpatient Care Management  Direct Mobile: [phone]

## 2020-11-11 NOTE — PROGRESS NOTES
[facility]  Neuroscience and Spine [place]  Neurology Daily Note      Admission Date:11/10/2020   Date of service: 11/11/2020   Hospital Day: 2                                                   Assessment and Plan:   #.  Lewy body dementia presenting with significant visual hallucinations and cognitive impairment and motor/gait decline  -Cholinesterase inhibitor such as donepezil have been shown to assist with decreasing hallucinations and behavioral issues in patients with Lewy body disease.  I will order donepezil.  Ordered tsh  Antipsychotic agents need to be used carefully in patients with Lewy body as they sometimes can be poorly tolerated or actually worsen the underlying condition/behavior.  Close continued involvement with psychiatry will be important given the significance of the behavioral concerns  #.  Gait/falling-this is likely due to motor involvement of the Lewy bodies  Recommend physical therapy assessment to determine patient's current level of safety/fall risk.  Her findings of parkinsonism are not noticeable today-findings yesterday may have been more related to the effects of the toxicity from taking too much Seroquel.  This will need to be monitored on an outpatient basis.  #. PT/OT/Speech  --Would recommend physical therapy and Occupational Therapy assessments of fall risk and home safety given the concerns with regard to her following.  This will be helpful to determine if returning to her previous living situation is appropriate.  Perhaps she would benefit from transitional care in the recovery of her left arm injury    I can see her in follow-up in my clinic in 6 to 8 weeks for monitoring and addressing other issues related to Lewy body dementia.  Some of this plan was discussed with the patient's son.  Please contact me if there are questions regarding these recommendations         Interval History:   Patient is significantly improved and is not as lethargic as my  "evaluation yesterday.  I was able to contact the patient's brother by telephone.  Patient did move into [facility] in May 2019.  That was shortly after she started experience hallucinations in which she reported that there were \"Slothes\" in her apartment.  There was a decline in her ability to handle financial affairs and other activities of daily living prior to that time.  When she initially entered the facility, she significantly improved which was attributed to a stable environment and regular nutrition.  However, over the last year there has been increasing number of falls.  This has been much more significant over the last month.  She has been falling almost on a daily basis in the last 2 weeks.  The falls tend to occur more at night but sometimes even falling out of her recliner.  She chooses to sleep in a recliner at night because of believing there are other animals/persons sleeping in the area of her apartment where her bed is.  Most of the falls during the daytime seem to occur with tripping/uneven surfaces.    In general she has been walking more slowly.  There is been a slight tremor observed by the brother at times.  He is observed it has been harder and harder for her to get in and out of vehicles.  There have been significant visual issues.    She has been receiving assistance with taking medication from staff.  Initially this was daily however, apparently she has been given more responsibility to manage her medications several days at a time           Medications:   Scheduled Meds:    atenolol  150 mg Oral Daily     buPROPion  450 mg Oral QAM     carboxymethylcellulose PF  1 drop Left Eye BID     clotrimazole   Topical BID     clotrimazole-betamethasone   Topical BID     fluconazole  150 mg Oral Once per day on Wed     furosemide  40 mg Oral Daily     levothyroxine  125 mcg Oral QAM AC     miconazole   Topical BID     multivitamin w/minerals  1 tablet Oral Daily     polyethylene glycol  17 " g Oral Daily     senna-docusate  1 tablet Oral BID    Or     senna-docusate  2 tablet Oral BID     sertraline  200 mg Oral Daily     triamcinolone   Topical TID     Vitamin D3  125 mcg Oral Daily     PRN Meds: acetaminophen, acetaminophen, melatonin, naloxone, ondansetron **OR** ondansetron, traMADol        Physical Exam:   Vitals: Temp: 98.2  F (36.8  C) Temp src: Oral BP: 127/54 Pulse: 62   Resp: 16 SpO2: 94 % O2 Device: None (Room air)    Vital Signs with Ranges: Temp:  [97.4  F (36.3  C)-98.9  F (37.2  C)] 98.2  F (36.8  C)  Pulse:  [55-64] 62  Resp:  [16-18] 16  BP: (127-152)/(54-69) 127/54  SpO2:  [90 %-100 %] 94 %    General Appearance:  No acute distress  Neuro:       Mental Status Exam:   Alert and oriented.  Speech is normal.       Cranial Nerves: Extraocular movements intact facial strength and sensation normal.  Essentially normal facial expression           Motor:   Antigravity strength intact x4, left upper extremity in brace, she has chronic Dupuytren's contracture in the left hand and therefore has not been able to straighten the fingers chronically       Reflexes: Not tested       Sensory: Not tested                   Coordination:   Alternating motion rate is intact in all 4 extremities.  It is not as low amplitude as what was observed yesterday       Gait: Not tested due to concerns of fall risk    Extremities: No clubbing, no cyanosis, no edema       Data:   ROUTINE IP LABS (Last 3results)  CBC RESULTS:     Recent Labs   Lab Test 11/11/20  0557 11/10/20  1057 12/28/19  1222   WBC 5.8 5.3 9.0   RBC 3.71* 3.67* 4.05   HGB 12.1 12.0 12.7   HCT 36.9 36.8 40.4   * 126* 130*     Basic Metabolic Panel:  Recent Labs   Lab Test 11/10/20  1057 12/28/19  1222    139   POTASSIUM 3.4 4.0   CHLORIDE 104 104   CO2 31 29   BUN 26 26   CR 1.22* 1.03   GLC 99 94   PRASHANTH 9.2 9.4     Liver panel:  Recent Labs   Lab Test 11/11/20  0557 11/10/20  1057 12/28/19  1222   PROTTOTAL 6.8 7.3 7.3   ALBUMIN 3.2*  3.5 3.9   BILITOTAL 0.9 0.6 0.7   ALKPHOS 83 82 97   AST 17 16 39   ALT 22 20 33     INR:  Recent Labs   Lab Test 12/28/19  1222   INR 0.97      Thyroid Panel:  Recent Labs   Lab Test 12/28/19  1222   TSH 8.63*   T4 0.87      Troponin I:   Recent Labs   Lab Test 11/10/20  1057 12/28/19  1222   TROPI <0.015 <0.015             TIME   This was in part a telemedicine visit that was performed with the originating site at [facility] in the distant site [facility].  Verbal consent was given to participate in the video visit using hospital-based poly-com technology.  This occurred during the coronavirus public health emergency and was requested due to contact concerns.  The patient was aware of the nature of telemedicine visits and that I would evaluate the patient and make diagnostic and treatment recommendations based on my evaluation. The visits are not being recorded and personal health information is protected.  Our team would provide follow-up care in person if and when the patient needs it.  Patient identification was verified before the start of the encounter.  The video conference took place between [id number]  40minutes Evaluation/managment        Ifeoma Breaux M.D.  Neurologist  [facility]  Office [phone]

## 2020-11-11 NOTE — PLAN OF CARE
Pt alert, disoriented to time. She often states the wrong location at first, but corrects herself soon after. CMS intact. Skin pink/reddened around abdominal folds, breasts and mere area; powder and scheduled creams used. VSS on RA besides some slightly elevated BP. Pt denies SOB or dizziness, but is quite unsteady with ambulation, requiring 1-2 assist ww. 1 large soft/loose BM, and voiding adequately via BSC or BR. Pt c/o some pain in L hip intermittently with repositioning, but denies any pain at rest. PRN Tylenol given. Multiple abrasions and bruising to face, bilateral arms and legs. Continue to monitor.

## 2020-11-11 NOTE — CONSULTS
[facility]    Orthopedic Consultation    Fern Simons MRN# [id number]   Age: 75 year old YOB: 1945     Date of Admission:  11/10/2020    Reason for consult: Distal radial fracture       Requesting physician: Dr. Vera       Level of consult: One-time consult to assist in determining a diagnosis and to recommend an appropriate treatment plan           Assessment and Plan:   Assessment:   Left distal radius fracture, non-displaced, closed, traumatic from fall from standing      Plan:   Continue splint x 6 weeks  Non-WB left UE, nothing heavier than full coffee cup  Elevate left UE when able  Ice PRN  Tylenol for pain control, tramadol for pain PRN  PT - may use platform walker  [facility] 4 weeks to evaluate after fracture. No follow up labs or test are needed prior to visit. At this visit x-rays will be taken and questions to be answered.  If patient at TCU and able to have XR there may have a virtual appointment with POA/staff  Call for appointment (Catherine- Patient Coordinator): [phone]  After hours: [phone]           Chief Complaint:   Left wrist fracture         History of Present Illness:   This patient is a 75 year old female who presents with the following condition requiring a hospital admission:    Patient unable to provide HPI due to dementia. Per ED note: Per report, the patient fell out of her recliner last night. She states that she was able to get up off the floor with help from staff. This morning staff checked on her again per routine and found her confused and noticed that 3 nights worth of her evening Seroquel were gone. The patient states that she hurts everywhere especially her left elbow and right knee. Patient normally uses a walker to ambulate.  She has apparently fallen almost daily during the past week, the circumstances of which are unclear.          Past Medical History:      Past Medical History:   Diagnosis Date     Depressive disorder      Hypertension      Thyroid disease              Past Surgical History:   No past surgical history on file.          Social History:     Social History     Tobacco Use     Smoking status: Former Smoker     Quit date: 9/15/1980     Years since quittin.1     Smokeless tobacco: Never Used   Substance Use Topics     Alcohol use: No             Family History:     Family History   Problem Relation Age of Onset     Diabetes Brother              Immunizations:     VACCINE/DOSE   Diptheria   DPT   DTAP   HBIG   Hepatitis A   Hepatitis B   HIB   Influenza   Measles   Meningococcal   MMR   Mumps   Pneumococcal   Polio   Rubella   Small Pox   TDAP   Varicella   Zoster             Allergies:     Allergies   Allergen Reactions     Ciprofloxacin      Sulfa Drugs              Medications:     Current Facility-Administered Medications   Medication     acetaminophen (TYLENOL) Suppository 650 mg     acetaminophen (TYLENOL) tablet 650 mg     atenolol (TENORMIN) tablet 150 mg     buPROPion (WELLBUTRIN XL) 24 hr tablet 450 mg     carboxymethylcellulose PF (REFRESH LIQUIGEL) 1 % ophthalmic gel 1 drop     clotrimazole (LOTRIMIN) 1 % cream     clotrimazole-betamethasone (LOTRISONE) cream     fluconazole (DIFLUCAN) tablet 150 mg     furosemide (LASIX) tablet 40 mg     levothyroxine (SYNTHROID/LEVOTHROID) tablet 125 mcg     melatonin tablet 1 mg     miconazole (MICATIN) 2 % powder     multivitamin w/minerals (THERA-VIT-M) tablet 1 tablet     ondansetron (ZOFRAN-ODT) ODT tab 4 mg    Or     ondansetron (ZOFRAN) injection 4 mg     polyethylene glycol (MIRALAX) Packet 17 g     senna-docusate (SENOKOT-S/PERICOLACE) 8.6-50 MG per tablet 1 tablet    Or     senna-docusate (SENOKOT-S/PERICOLACE) 8.6-50 MG per tablet 2 tablet     sertraline (ZOLOFT) tablet 200 mg     triamcinolone (KENALOG) 0.1 % cream     Vitamin D3 (CHOLECALCIFEROL) tablet 125 mcg             Review of  Systems:   Unable to ROS due to patient somnolence this AM/dementia at baseline          Physical Exam:   All vitals have been reviewed  Patient Vitals for the past 24 hrs:   BP Temp Temp src Pulse Resp SpO2   11/11/20 0744 136/55 98.2  F (36.8  C) Oral 64 16 90 %   11/11/20 0402 (!) 152/61 98.8  F (37.1  C) Oral 64 16 94 %   11/10/20 2358 (!) 145/66 98.9  F (37.2  C) Oral 62 16 95 %   11/10/20 1933 (!) 140/61 98.3  F (36.8  C) Oral 57 18 97 %   11/10/20 1644 137/69 97.4  F (36.3  C) Oral 55 -- 100 %   11/10/20 1500 (!) 162/69 -- -- 53 30 100 %   11/10/20 1430 (!) 157/75 -- -- 58 10 100 %   11/10/20 1400 (!) 142/74 -- -- 62 20 (!) 74 %   11/10/20 1330 (!) 161/73 -- -- 54 14 99 %   11/10/20 1300 (!) 200/189 -- -- 56 14 99 %   11/10/20 1236 (!) 153/73 97.6  F (36.4  C) Oral 54 14 99 %   11/10/20 1230 (!) 153/73 -- -- 57 14 100 %   11/10/20 1200 -- -- -- 56 -- 98 %   11/10/20 1100 (!) 140/65 -- -- 58 18 99 %     No intake or output data in the 24 hours ending 11/11/20 1055  Patient sleeping soundly in bed  Awakens to voice but falls back asleep quickly  Left wrist splint in place  No areas of lacerations or abrasions to the left UE  Mild scattered ecchymosis  No pain with ROM of the left elbow or fingers  Sensation grossly intact in hand  + radial pulse  Brisk cap refill          Data:   All laboratory data reviewed  Results for orders placed or performed during the hospital encounter of 11/10/20   CT Head w/o Contrast     Status: None    Narrative    CT OF THE HEAD WITHOUT CONTRAST 11/10/2020 11:21 AM     COMPARISON: Head CT 8/18/2020    HISTORY: Fall, right forehead trauma, AMS with nonfocal exam.    TECHNIQUE: 5 mm thick axial CT images of the head were acquired  without IV contrast material.    FINDINGS: There is moderate diffuse cerebral volume loss. There are  subtle patchy areas of decreased density in the cerebral white matter  bilaterally that are consistent with sequela of chronic small vessel  ischemic  disease.    The ventricles and basal cisterns are within normal limits in  configuration given the degree of cerebral volume loss. There is no  midline shift. There are no extra-axial fluid collections.    No intracranial hemorrhage, mass or recent infarct.    The visualized paranasal sinuses are well-aerated. There is no  mastoiditis. There are no fractures of the visualized bones.      Impression    IMPRESSION: Diffuse cerebral volume loss and cerebral white matter  changes consistent with chronic small vessel ischemic disease. No  evidence for acute intracranial pathology.      Radiation dose for this scan was reduced using automated exposure  control, adjustment of the mA and/or kV according to patient size, or  iterative reconstruction technique.    TAMIKA JIMENEZ MD   XR Chest 2 Views     Status: None    Narrative    XR CHEST 2 VW  11/10/2020 12:01 PM       INDICATION: fall, weakness, no fever/cough  COMPARISON: 12/28/2019       Impression    IMPRESSION: Mild cardiomegaly. Otherwise negative chest.    MED WALKER MD   Radius/Ulna XR,  PA &LAT, left     Status: None    Narrative    LEFT FOREARM TWO VIEWS, LEFT WRIST THREE OR MORE VIEWS 11/10/2020  12:02 PM     HISTORY: Fall with acute pain.    FINDINGS: Mild thumb CMC degenerative arthrosis.      Impression    IMPRESSION: Distal radius fracture. There is minimal displacement at  the dorsal aspect of the fracture.    HANNAH HARVEY MD   XR Wrist Left G/E 3 Views     Status: None    Narrative    LEFT FOREARM TWO VIEWS, LEFT WRIST THREE OR MORE VIEWS 11/10/2020  12:02 PM     HISTORY: Fall with acute pain.    FINDINGS: Mild thumb CMC degenerative arthrosis.      Impression    IMPRESSION: Distal radius fracture. There is minimal displacement at  the dorsal aspect of the fracture.    HANNAH HARVEY MD   XR Knee Right 3 Views     Status: None    Narrative    TIBIA AND FIBULA RIGHT TWO VIEWS, KNEE RIGHT THREE VIEWS   11/10/2020  12:04 PM     HISTORY: Fall with acute  pain.    FINDINGS: Patellofemoral and lateral knee compartment osteoarthritis.      Impression    IMPRESSION: No fracture identified.    HANNAH HAVREY MD   XR Tibia & Fibula Right 2 Views     Status: None    Narrative    TIBIA AND FIBULA RIGHT TWO VIEWS, KNEE RIGHT THREE VIEWS   11/10/2020  12:04 PM     HISTORY: Fall with acute pain.    FINDINGS: Patellofemoral and lateral knee compartment osteoarthritis.      Impression    IMPRESSION: No fracture identified.    HANNAH HARVEY MD   CBC with platelets differential     Status: Abnormal   Result Value Ref Range    WBC 5.3 4.0 - 11.0 10e9/L    RBC Count 3.67 (L) 3.8 - 5.2 10e12/L    Hemoglobin 12.0 11.7 - 15.7 g/dL    Hematocrit 36.8 35.0 - 47.0 %     78 - 100 fl    MCH 32.7 26.5 - 33.0 pg    MCHC 32.6 31.5 - 36.5 g/dL    RDW 14.1 10.0 - 15.0 %    Platelet Count 126 (L) 150 - 450 10e9/L    Diff Method Automated Method     % Neutrophils 74.0 %    % Lymphocytes 16.9 %    % Monocytes 8.1 %    % Eosinophils 0.2 %    % Basophils 0.4 %    % Immature Granulocytes 0.4 %    Nucleated RBCs 0 0 /100    Absolute Neutrophil 4.0 1.6 - 8.3 10e9/L    Absolute Lymphocytes 0.9 0.8 - 5.3 10e9/L    Absolute Monocytes 0.4 0.0 - 1.3 10e9/L    Absolute Eosinophils 0.0 0.0 - 0.7 10e9/L    Absolute Basophils 0.0 0.0 - 0.2 10e9/L    Abs Immature Granulocytes 0.0 0 - 0.4 10e9/L    Absolute Nucleated RBC 0.0     Anisocytosis Slight     Platelet Estimate       Automated count confirmed.  Platelet morphology is normal.   Comprehensive metabolic panel     Status: Abnormal   Result Value Ref Range    Sodium 137 133 - 144 mmol/L    Potassium 3.4 3.4 - 5.3 mmol/L    Chloride 104 94 - 109 mmol/L    Carbon Dioxide 31 20 - 32 mmol/L    Anion Gap 2 (L) 3 - 14 mmol/L    Glucose 99 70 - 99 mg/dL    Urea Nitrogen 26 7 - 30 mg/dL    Creatinine 1.22 (H) 0.52 - 1.04 mg/dL    GFR Estimate 43 (L) >60 mL/min/[1.73_m2]    GFR Estimate If Black 50 (L) >60 mL/min/[1.73_m2]    Calcium 9.2 8.5 - 10.1 mg/dL    Bilirubin  Total 0.6 0.2 - 1.3 mg/dL    Albumin 3.5 3.4 - 5.0 g/dL    Protein Total 7.3 6.8 - 8.8 g/dL    Alkaline Phosphatase 82 40 - 150 U/L    ALT 20 0 - 50 U/L    AST 16 0 - 45 U/L   Troponin I     Status: None   Result Value Ref Range    Troponin I ES <0.015 0.000 - 0.045 ug/L   UA with Microscopic     Status: Abnormal   Result Value Ref Range    Color Urine Yellow     Appearance Urine Clear     Glucose Urine Negative NEG^Negative mg/dL    Bilirubin Urine Negative NEG^Negative    Ketones Urine Negative NEG^Negative mg/dL    Specific Gravity Urine 1.013 1.003 - 1.035    Blood Urine Negative NEG^Negative    pH Urine 6.0 5.0 - 7.0 pH    Protein Albumin Urine Negative NEG^Negative mg/dL    Urobilinogen mg/dL Normal 0.0 - 2.0 mg/dL    Nitrite Urine Negative NEG^Negative    Leukocyte Esterase Urine Negative NEG^Negative    Source Catheterized Urine     WBC Urine <1 0 - 5 /HPF    RBC Urine 0 0 - 2 /HPF    Squamous Epithelial /HPF Urine 2 (H) 0 - 1 /HPF    Mucous Urine Present (A) NEG^Negative /LPF   Asymptomatic COVID-19 Virus (Coronavirus) by PCR     Status: None    Specimen: Nasopharyngeal   Result Value Ref Range    COVID-19 Virus PCR to [facility] - Source Nasopharyngeal     COVID-19 Virus PCR to [facility] - Result Not Detected    Acetaminophen level     Status: None   Result Value Ref Range    Acetaminophen Level <2 mg/L   Alcohol ethyl     Status: None   Result Value Ref Range    Ethanol g/dL <0.01 <0.01 g/dL   Salicylate level     Status: None   Result Value Ref Range    Salicylate Level <2 mg/dL   CBC with platelets differential     Status: Abnormal   Result Value Ref Range    WBC 5.8 4.0 - 11.0 10e9/L    RBC Count 3.71 (L) 3.8 - 5.2 10e12/L    Hemoglobin 12.1 11.7 - 15.7 g/dL    Hematocrit 36.9 35.0 - 47.0 %     78 - 100 fl    MCH 32.6 26.5 - 33.0 pg    MCHC 32.8 31.5 - 36.5 g/dL    RDW 13.7 10.0 - 15.0 %    Platelet Count 113 (L) 150 - 450 10e9/L    Diff Method Automated Method     % Neutrophils 75.1 %    %  Lymphocytes 15.9 %    % Monocytes 8.3 %    % Eosinophils 0.3 %    % Basophils 0.2 %    % Immature Granulocytes 0.2 %    Nucleated RBCs 0 0 /100    Absolute Neutrophil 4.4 1.6 - 8.3 10e9/L    Absolute Lymphocytes 0.9 0.8 - 5.3 10e9/L    Absolute Monocytes 0.5 0.0 - 1.3 10e9/L    Absolute Eosinophils 0.0 0.0 - 0.7 10e9/L    Absolute Basophils 0.0 0.0 - 0.2 10e9/L    Abs Immature Granulocytes 0.0 0 - 0.4 10e9/L    Absolute Nucleated RBC 0.0    Hepatic panel     Status: Abnormal   Result Value Ref Range    Bilirubin Direct 0.1 0.0 - 0.2 mg/dL    Bilirubin Total 0.9 0.2 - 1.3 mg/dL    Albumin 3.2 (L) 3.4 - 5.0 g/dL    Protein Total 6.8 6.8 - 8.8 g/dL    Alkaline Phosphatase 83 40 - 150 U/L    ALT 22 0 - 50 U/L    AST 17 0 - 45 U/L   EKG 12-lead, tracing only     Status: None   Result Value Ref Range    Interpretation ECG Click View Image link to view waveform and result    Neurology IP Consult: General (non-stroke/non-ICU); Patient to be seen: Routine - within 24 hours; inc hallucinations/delusions despite escalating seroquel. psychiatrist asking for neurologist opinion, possibly lewy body; Consultant may enter orde...     Status: None ()    Ifeoam Arredondo MD     11/10/2020  4:58 PM            [facility]  [facility]    Neurology Consultation    Fern Simons MRN# [id number]   YOB: 1945 Age: 75 year old    Code Status:No CPR- Do NOT Intubate   Date of Admission: 11/10/2020  Date of Consult:11/10/2020                                                                                         Assessment and Plan:                                         #. Encephalopathy,sleepiness likely related to sedative effect.    Records suggest cognitive impairment, dementia.  Recent visual   hallucinations concerning for Lewy body dementia.    --hold sedative/seroquel  Reevaluate tomorrow.  Recheck tsh as has been high in past.   #. Frequent falls/gait  impairment, suspect possible Parkinsonism   which will occur with Lewy Body dementia.  --will reevaluate tomorrow in person.    Obtain further hx from brother, JAIME tcb.      #. DVT Prophylaxis  --Per hospitalist service  #. PT/OT/Speech  --As tolerated  #. Nutrition / GI Prophylaxis  --Per recommendations of speech therapy    With history given, patient will need higher level of supervision   of ADL, medications on discharge.       ------------------------------------------------------------------  ----------------  ------------------------------------------------------------------  ----------------  Reason for consult:see above       Chief Complaint:   Confusion/falls.  History is obtained from the patient / chart       History of Present Illness:   This patient is a 75 year old female who presents with hx per ED:    presents by EMS from an independent living facility with altered   mental status and evaluation after a fall.  Per report, the   patient fell out of her recliner last night. She states that she   was able to get up off the floor with help from staff. This   morning staff checked on her again per routine and found her   confused and noticed that 3 nights worth of her evening Seroquel   were gone. The patient states that she hurts everywhere   especially her left elbow and right knee. Patient normally uses a   walker to ambulate.  She has apparently fallen almost daily   during the past week, the circumstances of which are unclear       Patient reports moving to Assisted Living 2 years ago.  Stopped driving 8 months ago, still has car?  Frequent falling Using walker for 1 year.  Unclear why falls, last fall occurred while sitting in chair.    Excerpt from PCP note 10/5/20:  psych NP Charmaine Haley about [place]. She is concerned because   despite increased doses of antipsychotic medications, the pt's   visual hallucinations are not improving, she exhibits delusions   and paranoia and cognitive decline. She  is concerned about   possibility of Lewy Body dementia.     Reviewed chart, pt was seen along with Dr Hubbard 10/2019 for   this concern and his opinion was low concern for LBD. She had an   MRI of the brain in 4/2019.     Plan will Be:  -Charmaine will communicate with pt's brother who helps to   coordinate her carwe           Past Medical History:     Past Medical History:   Diagnosis Date     Depressive disorder      Hypertension      Thyroid disease      Unspecified hypothyroidism       Esophageal reflux       Depressive disorder, not elsewhere classified   followed by   psychiatry   Attention deficit disorder without mention of hyperactivity       Congenital hiatus hernia       Chronic low back pain       Bulimia   history in 1098's, none since   Major depressive disorder   recurrent episode, in partial or   unspecified remission   Compulsive overeater       Cutaneous skin tags       Obesity (BMI 35.0-39.9 without comorbidity)       Dystrophic nail       Mycotic toenails       Actinic keratosis       Labile blood pressure       Arthritis, degenerative       Vitamin D deficiency       Anxiety       Hypothyroidism       Thygeson's superficial punctate keratitis       Cataract             Past Surgical History:   No past surgical history on file.   CHOLECYSTECTOMY 1998        TONSILLECTOMY 1970        COLONOSCOPY SCREENING 2005   wnl, 10 years     XR MAMMO BILATERAL DIAGNOSTIC (IA) 4/27/07        removal of toenail          HERNIA REPAIR 9/2007   right inguinal     BILATERAL UPPER LID BLEPHAROPLASTY, NON COSMETIC               Social History:     Social History     Socioeconomic History     Marital status: Single     Spouse name: None     Number of children: None     Years of education: None     Highest education level: None   Occupational History     None   Social Needs     Financial resource strain: None     Food insecurity     Worry: None     Inability: None     Transportation needs     Medical: None      Non-medical: None   Tobacco Use     Smoking status: Former Smoker     Quit date: 9/15/1980     Years since quittin.1     Smokeless tobacco: Never Used   Substance and Sexual Activity     Alcohol use: No     Drug use: No     Sexual activity: None   Lifestyle     Physical activity     Days per week: None     Minutes per session: None     Stress: None   Relationships     Social connections     Talks on phone: None     Gets together: None     Attends Jainism service: None     Active member of club or organization: None     Attends meetings of clubs or organizations: None     Relationship status: None     Intimate partner violence     Fear of current or ex partner: None     Emotionally abused: None     Physically abused: None     Forced sexual activity: None   Other Topics Concern     None   Social History Narrative     None     Patient denies smoking, no significant alcohol intake, denies   illicit drugs use [id number]      Family History:     Family History   Problem Relation Age of Onset     Diabetes Brother      Reviewed and not felt to be contributory.        Home Medications:     Prior to Admission Medications   Prescriptions Last Dose Informant Patient Reported? Taking?   MAGNESIUM CITRATE PO prn Nursing Home Yes Yes   Sig: Take 375 mg by mouth daily as needed (constipation)   QUEtiapine (SEROQUEL) 100 MG tablet  at 2000 senior living Yes Yes     Sig: Take 150 mg by mouth every evening    QUEtiapine (SEROQUEL) 50 MG tablet  Nursing Home Yes Yes   Sig: Take 50 mg by mouth every morning hallucinations   Vitamin D3 (CHOLECALCIFEROL) 125 MCG (5000 UT) tablet  at 0800   senior living Yes Yes   Sig: Take 125 mcg by mouth daily   atenolol (TENORMIN) 50 MG tablet  at 0800 senior living Yes Yes   Sig: Take 150 mg by mouth daily    buPROPion (WELLBUTRIN XL) 150 MG 24 hr tablet  at 0800 Nursing   Home Yes Yes   Sig: Take 450 mg by mouth every morning    clotrimazole (LOTRIMIN) 1 % external cream  Nursing Home Yes Yes   Sig:  Apply topically 2 times daily candidial intertrigo   clotrimazole-betamethasone (LOTRISONE) 1-0.05 % external cream    Nursing Home Yes Yes   Sig: Apply topically 2 times daily candinitis   cycloSPORINE (RESTASIS) 0.05 % ophthalmic emulsion  Nursing Home   Yes Yes   Sig: Place 1 drop Into the left eye 2 times daily   fluconazole (DIFLUCAN) 150 MG tablet  Nursing Home Yes Yes   Sig: Take 150 mg by mouth once a week On Wednesdays.  Ending   11/25/2020   furosemide (LASIX) 40 MG tablet  Nursing Home Yes Yes   Sig: Take 40 mg by mouth daily   levothyroxine (SYNTHROID/LEVOTHROID) 125 MCG tablet  at 0800   snf Yes Yes   Sig: Take 125 mcg by mouth every morning (before breakfast)   melatonin 3 MG tablet  at 2000 snf Yes Yes   Sig: Take 1 mg by mouth At Bedtime   multivitamin w/minerals (THERA-VIT-M) tablet  at 0800  Yes Yes   Sig: Take 1 tablet by mouth daily   nystatin (MYCOSTATIN) 768791 UNIT/GM external powder  Nursing   Home Yes Yes   Sig: Apply topically 2 times daily Skin breakdown   sertraline (ZOLOFT) 100 MG tablet  at 0800 snf Yes Yes   Sig: Take 200 mg by mouth daily   triamcinolone (KENALOG) 0.1 % external cream  Nursing Home Yes   Yes   Sig: Apply topically 3 times daily Candidal intertrigo      Facility-Administered Medications: None          Allergy:     Allergies   Allergen Reactions     Ciprofloxacin      Sulfa Drugs           Inpatient Medications:   Scheduled Meds:    [START ON 11/11/2020] atenolol  150 mg Oral Daily     [START ON 11/11/2020] buPROPion  450 mg Oral QAM     carboxymethylcellulose PF  1 drop Left Eye BID     clotrimazole   Topical BID     clotrimazole-betamethasone   Topical BID     fluconazole  150 mg Oral Weekly     [START ON 11/11/2020] furosemide  40 mg Oral Daily     [START ON 11/11/2020] levothyroxine  125 mcg Oral QAM AC     multivitamin w/minerals  1 tablet Oral Daily     nystatin   Topical BID     polyethylene glycol  17 g Oral Daily     senna-docusate  1  tablet Oral BID    Or     senna-docusate  2 tablet Oral BID     [START ON 11/11/2020] sertraline  200 mg Oral Daily     triamcinolone   Topical TID     [START ON 11/11/2020] Vitamin D3  125 mcg Oral Daily     PRN Meds: acetaminophen, acetaminophen, melatonin, ondansetron   **OR** ondansetron        Review of Systems    The Review of Systems is negative other than noted in the HPI    CONSTITUTIONAL: negative for fever, chills, change in weight  INTEGUMENTARY/SKIN: some skin fungus problems   ENT/MOUTH: no sore throat, new sinus pain or nasal drainage, no   neck mass noted  RESP: No pleuretic pain, No cough, no hemoptysis, No SOB   CV: negative for chest pain, palpitations or peripheral edema  GI: no nausea, vomiting, change in stools  : no dysuria or hematuria  MUSCULOSKELETAL: no myalgias, arthralgias or join efffusion  ENDOCRINE: no history of polyuria, polydyspsia or symptoms of   thyroid dysfunction  PSYCHIATRIC: no change in mood stable  LYMPHATIC: no new lymphadenopathy  HEME: no bleeding or easy bruisability  NEURO: see HPI       Physical Exam:   Physical Exam   Vitals:  Height:Data Unavailable  Weight:0 lbs 0 oz   Temp: 97.6  F (36.4  C) Temp src: Oral BP: (!) 162/69 Pulse: 53     Resp: 30 SpO2: 100 %      General Appearance:  No acute distress, sleepy, bruises on legs,   left arm immobilized  Neuro:       Mental Status Exam:    Lethargic, but arousable, oriented to   place, own address, month, year election results.  Moderate   monotone speech, Language intact, sometimes difficult to   understand.         Cranial Nerves:   Vision/fields intact, EOMI, Facial   strength intact, reduced facial expression, Tongue and jaw   midline.  Shoulder elevatin symmetric.           Motor:   Antigravity strength and bulk nl x4       Reflexes:  Unable to test       Sensory:  Unable to test                   Coordination:   AMR of finger movement of low amplitude and   regularity.  Mild in feet       Gait:  Unable due to  concern for fall risk.   Neck: no nuchal rigidity, normal thyroid. No carotid bruits.    Cardiovascular: Regular rate and rhythm, no m/r/g  Extremities: No clubbing, no cyanosis, no edema       Data:   ROUTINE IP LABS   CBC RESULTS:     Recent Labs   Lab 11/10/20  1057   WBC 5.3   RBC 3.67*   HGB 12.0   HCT 36.8   *     Basic Metabolic Panel:   Recent Labs   Lab Test 11/10/20  1057 12/28/19  1222    139   POTASSIUM 3.4 4.0   CHLORIDE 104 104   CO2 31 29   BUN 26 26   CR 1.22* 1.03   GLC 99 94   PRASHANTH 9.2 9.4     Liver panel:  Recent Labs   Lab Test 11/10/20  1057 12/28/19  1222   PROTTOTAL 7.3 7.3   ALBUMIN 3.5 3.9   BILITOTAL 0.6 0.7   ALKPHOS 82 97   AST 16 39   ALT 20 33     INR:  Recent Labs   Lab Test 12/28/19  1222   INR 0.97      Thyroid Panel:  Recent Labs   Lab Test 12/28/19  1222   TSH 8.63*   T4 0.87      Troponin I:   Recent Labs   Lab Test 11/10/20  1057 12/28/19  1222   TROPI <0.015 <0.015   [facility]            IMAGING:   All imaging studies were reviewed personally    CT OF THE HEAD WITHOUT CONTRAST 11/10/2020 11:21 AM      COMPARISON: Head CT 8/18/2020     HISTORY: Fall, right forehead trauma, AMS with nonfocal exam.     TECHNIQUE: 5 mm thick axial CT images of the head were acquired  without IV contrast material.     FINDINGS: There is moderate diffuse cerebral volume loss. There   are  subtle patchy areas of decreased density in the cerebral white   matter  bilaterally that are consistent with sequela of chronic small   vessel  ischemic disease.     The ventricles and basal cisterns are within normal limits in  configuration given the degree of cerebral volume loss. There is   no  midline shift. There are no extra-axial fluid collections.     No intracranial hemorrhage, mass or recent infarct.     The visualized paranasal sinuses are well-aerated. There is no  mastoiditis. There are no fractures of the visualized bones.                                                                       IMPRESSION: Diffuse cerebral volume loss and cerebral white   matter  changes consistent with chronic small vessel ischemic disease. No  evidence for acute intracranial pathology    MR HEAD BRAIN WO  LOCATION: [facility] IMAGING  DATE/TIME: 4/5/2019 1:41 PM    INDICATION: Dementia. Abnormal comparison head MRI.  COMPARISON: Head MRI 01/14/2005  TECHNIQUE: Routine multiplanar multisequence head MRI without   intravenous  contrast.    FINDINGS:  INTRACRANIAL CONTENTS: No acute or subacute infarct. No mass,   acute hemorrhage,  or extra-axial fluid collections. Scattered nonspecific T2/FLAIR  hyperintensities within the cerebral white matter most consistent   with mild  chronic microvascular ischemic change. Mild supratentorial   generalized volume  loss. Moderate cerebellar volume loss, most pronounced in the   superior vermis.  This pattern is unchanged. No hydrocephalus. Normal position of   the cerebellar  tonsils.     SELLA: No significant abnormality accounting for technique.    OSSEOUS STRUCTURES/SOFT TISSUES: No aggressive osseous lesion   involving the  calvarium, skull base, or visualized upper cervical spine. The   major  intracranial vascular flow voids are maintained.     ORBITS: No significant abnormality accounting for technique.     SINUSES/MASTOIDS: No significant paranasal sinus mucosal disease.   No significant  middle ear or mastoid effusion.     CONCLUSION:  1.  No change.  2.  No mass, hemorrhage or stroke.  3.  Mild presumed chronic small vessel ischemic change.  4.  Moderate cerebellar volume loss, most pronounced in the   superior vermis.  5.  Mild supratentorial volume loss      This was in part a telemedicine visit that was performed with the   originating site at [facility] in the distant   site [facility].  Verbal consent was given to participate in the   video visit using hospital-based poly-com technology.  This   occurred during  the coronavirus public health emergency and was   requested due to contact concerns.  The patient was aware of the   nature of telemedicine visits and that I would evaluate the   patient and make diagnostic and treatment recommendations based   on my evaluation. The visits are not being recorded and personal   health information is protected.  Our team would provide   follow-up care in person if and when the patient needs it.    Patient identification was verified before the start of the   encounter.  The video conference took place between [id number]  Total time spent with visit related to care 60minutes.       Ifeoma Breaux M.D.  [facility], [facility].  Office [phone]                     Attestation:  I have reviewed today's vital signs, notes, medications, labs and imaging with Dr. Crowley.  Amount of time performed on this consult: 30 minutes.    Kenya Kemp PA-C

## 2020-11-11 NOTE — PLAN OF CARE
A/OX3,forgetful at times.Denies pain.Up with 1 assist/walker.Voiding well per bathroom.Tolerating regular diet.Left arm elevated on pillows,swollen noted,splint intact.NWB to left Upper extremity.Skin under splint checked.Redness blanchable under abdomen folds,groin,under Breast and mere area.Schedule cream and power applied.Noted abrasions/bruising on R side of face and forehead.Will continue to monitor.

## 2020-11-11 NOTE — PROGRESS NOTES
[facility]    Hospitalist Progress Note    Brief Summary:  Fern Simons is a 75 year old female from [facility] that presents with fall. She has history of Mild cognitive impairment, HTN, Hypothyroidism, Obesity, Depression.   The patient has had 7-8x falls in the last week. She is in independent living and has her medications set up for her but she takes her self, and there is some concern that 3 days of seroquel had been taken at once.      Assessment & Plan      Acute Metabolic Encephalopathy most likely secondary to Seroquel overdose.   Delusions and hallucinations  Of note patient has been having increasing falls and increasing levels of hallucinations, paranoia, and cognitive decline as per report, she is on high doses of Seroquel 150 mg at night and 50 mg every morning.   Presented with fall. There was concern for Lewy body dementia and Parkinson's. Neurology was consulted.     On my exam today, she is awake and alert, oriented X 3, reasonable memory, no resting tremor, no rigidity.  Gait no evaluated. I don't believe she has parkinson's.   I think her symptoms most likely related to Seroquel. No hallucination or paranoia at this time.   I will like to keep holding her Seroquel at this time. PT and OT to evaluate.   Neurology to follow.   SW for disposition.        Fall with left distal radial fracture  - in wrist splint, need 6 week of splint.   Orthopedic consulted, appreciate input.   NWB to left Upper extremity.        Hypothyroidism  - resume levothyroxine     HTN  - continue home atenolol     Depression  - sertaline      Continue to hold Seroquel at this time.   PT and OT to evaluate.   Awake, alert and oriented X 3 today.      Diet:   regular  DVT Prophylaxis: Pneumatic Compression Devices  Code Status: No CPR- Do NOT Intubate    Disposition: Expected discharge in 1-2 days once remain stable.     Deion Jeronimo MD  Text Page  (7am - 6pm)    Interval History    Patient feeling well this morning, conscious, alert and oriented. No fever, chills, chest pain, nausea or vomiting.     No other significant event overnight.      -Data reviewed today: I reviewed all new labs and imaging results over the last 24 hours. I personally reviewed no images or EKG's today.    Physical Exam   Temp: 98.2  F (36.8  C) Temp src: Oral BP: 127/54 Pulse: 62   Resp: 16 SpO2: 94 % O2 Device: None (Room air)    There were no vitals filed for this visit.  Vital Signs with Ranges  Temp:  [97.4  F (36.3  C)-98.9  F (37.2  C)] 98.2  F (36.8  C)  Pulse:  [53-64] 62  Resp:  [10-30] 16  BP: (127-162)/(54-75) 127/54  SpO2:  [74 %-100 %] 94 %  No intake/output data recorded.    Constitutional: awake, alert, cooperative, no apparent distress, and appears stated age, some bruise on her forehead on right side.   Eyes: Lids and lashes normal, pupils equal, round and reactive to light, extra ocular muscles intact, sclera clear, conjunctiva normal  Respiratory: No increased work of breathing, good air exchange, clear to auscultation bilaterally, no crackles or wheezing  Cardiovascular: Normal apical impulse, regular rate and rhythm, normal S1 and S2, no S3 or S4, and no murmur noted  GI: No scars, normal bowel sounds, soft, non-distended, non-tender, no masses palpated, no hepatosplenomegally  Musculoskeletal: no lower extremity pitting edema present, bruises on the both knee. Left wrist in splint, no swelling.   Neurologic: no focal deficit.     Medications       atenolol  150 mg Oral Daily     buPROPion  450 mg Oral QAM     carboxymethylcellulose PF  1 drop Left Eye BID     clotrimazole   Topical BID     clotrimazole-betamethasone   Topical BID     fluconazole  150 mg Oral Once per day on Wed     furosemide  40 mg Oral Daily     levothyroxine  125 mcg Oral QAM AC     miconazole   Topical BID     multivitamin w/minerals  1 tablet Oral Daily     polyethylene glycol  17 g Oral Daily     senna-docusate  1 tablet  Oral BID    Or     senna-docusate  2 tablet Oral BID     sertraline  200 mg Oral Daily     triamcinolone   Topical TID     Vitamin D3  125 mcg Oral Daily       Data   Recent Labs   Lab 11/11/20  0557 11/10/20  1057   WBC 5.8 5.3   HGB 12.1 12.0    100   * 126*   NA  --  137   POTASSIUM  --  3.4   CHLORIDE  --  104   CO2  --  31   BUN  --  26   CR  --  1.22*   ANIONGAP  --  2*   PRASHANTH  --  9.2   GLC  --  99   ALBUMIN 3.2* 3.5   PROTTOTAL 6.8 7.3   BILITOTAL 0.9 0.6   ALKPHOS 83 82   ALT 22 20   AST 17 16   TROPI  --  <0.015       No results found for this or any previous visit (from the past 24 hour(s)).

## 2020-11-12 ENCOUNTER — APPOINTMENT (OUTPATIENT)
Dept: OCCUPATIONAL THERAPY | Facility: CLINIC | Age: 75
End: 2020-11-12
Attending: INTERNAL MEDICINE
Payer: COMMERCIAL

## 2020-11-12 LAB
ANION GAP SERPL CALCULATED.3IONS-SCNC: 5 MMOL/L (ref 3–14)
BUN SERPL-MCNC: 23 MG/DL (ref 7–30)
CALCIUM SERPL-MCNC: 9 MG/DL (ref 8.5–10.1)
CHLORIDE SERPL-SCNC: 104 MMOL/L (ref 94–109)
CO2 SERPL-SCNC: 29 MMOL/L (ref 20–32)
CREAT SERPL-MCNC: 1.24 MG/DL (ref 0.52–1.04)
GFR SERPL CREATININE-BSD FRML MDRD: 42 ML/MIN/{1.73_M2}
GLUCOSE SERPL-MCNC: 88 MG/DL (ref 70–99)
POTASSIUM SERPL-SCNC: 3.2 MMOL/L (ref 3.4–5.3)
SODIUM SERPL-SCNC: 138 MMOL/L (ref 133–144)
T4 FREE SERPL-MCNC: 0.92 NG/DL (ref 0.76–1.46)
TSH SERPL DL<=0.005 MIU/L-ACNC: 12.14 MU/L (ref 0.4–4)

## 2020-11-12 PROCEDURE — 99225 PR SUBSEQUENT OBSERVATION CARE,LEVEL II: CPT | Performed by: INTERNAL MEDICINE

## 2020-11-12 PROCEDURE — 80048 BASIC METABOLIC PNL TOTAL CA: CPT | Performed by: INTERNAL MEDICINE

## 2020-11-12 PROCEDURE — 97530 THERAPEUTIC ACTIVITIES: CPT | Mod: GO

## 2020-11-12 PROCEDURE — 97110 THERAPEUTIC EXERCISES: CPT | Mod: GO

## 2020-11-12 PROCEDURE — 99207 PR CDG-CODE CATEGORY CHANGED: CPT | Performed by: INTERNAL MEDICINE

## 2020-11-12 PROCEDURE — 84439 ASSAY OF FREE THYROXINE: CPT | Performed by: INTERNAL MEDICINE

## 2020-11-12 PROCEDURE — 250N000013 HC RX MED GY IP 250 OP 250 PS 637: Performed by: HOSPITALIST

## 2020-11-12 PROCEDURE — 97535 SELF CARE MNGMENT TRAINING: CPT | Mod: GO

## 2020-11-12 PROCEDURE — G0378 HOSPITAL OBSERVATION PER HR: HCPCS

## 2020-11-12 PROCEDURE — 36415 COLL VENOUS BLD VENIPUNCTURE: CPT | Performed by: INTERNAL MEDICINE

## 2020-11-12 PROCEDURE — 250N000013 HC RX MED GY IP 250 OP 250 PS 637: Performed by: PSYCHIATRY & NEUROLOGY

## 2020-11-12 PROCEDURE — 97165 OT EVAL LOW COMPLEX 30 MIN: CPT | Mod: GO

## 2020-11-12 PROCEDURE — 84443 ASSAY THYROID STIM HORMONE: CPT | Performed by: INTERNAL MEDICINE

## 2020-11-12 PROCEDURE — 99207 PR CDG-MDM COMPONENT: MEETS LOW - DOWN CODED: CPT | Performed by: INTERNAL MEDICINE

## 2020-11-12 RX ORDER — LEVOTHYROXINE SODIUM 75 UG/1
150 TABLET ORAL
Status: DISCONTINUED | OUTPATIENT
Start: 2020-11-13 | End: 2020-11-20 | Stop reason: HOSPADM

## 2020-11-12 RX ADMIN — TRIAMCINOLONE ACETONIDE: 1 CREAM TOPICAL at 08:53

## 2020-11-12 RX ADMIN — MULTIPLE VITAMINS W/ MINERALS TAB 1 TABLET: TAB at 08:47

## 2020-11-12 RX ADMIN — CHOLECALCIFEROL TAB 125 MCG (5000 UNIT) 125 MCG: 125 TAB at 08:48

## 2020-11-12 RX ADMIN — DONEPEZIL HYDROCHLORIDE 5 MG: 5 TABLET, FILM COATED ORAL at 21:12

## 2020-11-12 RX ADMIN — CLOTRIMAZOLE: 1 CREAM TOPICAL at 08:53

## 2020-11-12 RX ADMIN — LEVOTHYROXINE SODIUM 125 MCG: 100 TABLET ORAL at 06:53

## 2020-11-12 RX ADMIN — POLYETHYLENE GLYCOL 3350 17 G: 17 POWDER, FOR SOLUTION ORAL at 08:46

## 2020-11-12 RX ADMIN — CLOTRIMAZOLE AND BETAMETHASONE DIPROPIONATE: 10; .5 CREAM TOPICAL at 08:53

## 2020-11-12 RX ADMIN — CLOTRIMAZOLE: 1 CREAM TOPICAL at 21:10

## 2020-11-12 RX ADMIN — CARBOXYMETHYLCELLULOSE SODIUM 1 DROP: 10 GEL OPHTHALMIC at 21:08

## 2020-11-12 RX ADMIN — CLOTRIMAZOLE AND BETAMETHASONE DIPROPIONATE: 10; .5 CREAM TOPICAL at 21:09

## 2020-11-12 RX ADMIN — ATENOLOL 150 MG: 50 TABLET ORAL at 08:47

## 2020-11-12 RX ADMIN — ACETAMINOPHEN 650 MG: 325 TABLET, FILM COATED ORAL at 03:57

## 2020-11-12 RX ADMIN — MICONAZOLE NITRATE: 20 POWDER TOPICAL at 08:53

## 2020-11-12 RX ADMIN — TRIAMCINOLONE ACETONIDE: 1 CREAM TOPICAL at 21:09

## 2020-11-12 RX ADMIN — SERTRALINE HYDROCHLORIDE 200 MG: 100 TABLET ORAL at 08:48

## 2020-11-12 RX ADMIN — FUROSEMIDE 40 MG: 40 TABLET ORAL at 08:48

## 2020-11-12 RX ADMIN — DOCUSATE SODIUM 50 MG AND SENNOSIDES 8.6 MG 2 TABLET: 8.6; 5 TABLET, FILM COATED ORAL at 08:48

## 2020-11-12 RX ADMIN — MICONAZOLE NITRATE: 20 POWDER TOPICAL at 21:09

## 2020-11-12 RX ADMIN — TRIAMCINOLONE ACETONIDE: 1 CREAM TOPICAL at 17:43

## 2020-11-12 RX ADMIN — BUPROPION HYDROCHLORIDE 450 MG: 150 TABLET, EXTENDED RELEASE ORAL at 08:48

## 2020-11-12 RX ADMIN — CARBOXYMETHYLCELLULOSE SODIUM 1 DROP: 10 GEL OPHTHALMIC at 08:47

## 2020-11-12 NOTE — PROGRESS NOTES
[facility]    Hospitalist Progress Note    Brief Summary:  Fern Simons is a 75 year old female from [facility] that presents with fall. She has history of Mild cognitive impairment, HTN, Hypothyroidism, Obesity, Depression.   The patient has had 7-8x falls in the last week. She is in independent living and has her medications set up for her but she takes her self, and there is some concern that 3 days of seroquel had been taken at once.      Assessment & Plan      Acute Metabolic Encephalopathy most likely secondary to Seroquel overdose.   Delusions and hallucinations  Of note patient has been having increasing falls and increasing levels of hallucinations, paranoia, and cognitive decline as per report, she is on high doses of Seroquel 150 mg at night and 50 mg every morning.   Presented with fall. There was concern for Lewy body dementia and Parkinson's. Neurology was consulted.     On my exam today, she is awake and alert, oriented X 3, reasonable memory, no resting tremor, no rigidity.  Gait no evaluated. I don't believe she has parkinson's.   I think her symptoms most likely related to Seroquel. No hallucination or paranoia at this time.   I will like to keep holding her Seroquel at this time and will discontinue at discharge. PT and OT to evaluate.   Neurology following appreciate input. PT is recommending TCU at this time.   She did well on my screening memory test, will ask OT to do mini mental exam.   SW for disposition. Neurology has started her on Aricept today        Fall with left distal radial fracture  - in wrist splint, need 6 week of splint.   Orthopedic consulted, appreciate input.   NWB to left Upper extremity.        Hypothyroidism  - resume levothyroxine, TSH check elevated, will increase Levothyroxine to 150 mcg daily and recheck  TSH in 4 weeks.      HTN  - continue home atenolol     Depression  - sertaline     No sign and symptoms of Parkinson's,  overall memory intact, will need out patient Neuro psych evaluation.  Will ask OT to do Mini mental exam.   PT to continue, recommend TCU  SW consulted for disposition.      Diet:   regular  DVT Prophylaxis: Pneumatic Compression Devices  Code Status: No CPR- Do NOT Intubate    Disposition: Expected discharge awaiting placement.     Deion Jeronimo MD  Text Page  (7am - 6pm)    Interval History   She has no hallucinations while she is in the hospital, alert and oriented, working with rehab this morning. No fever, chills, chest pain, headache or dizziness.     No other significant event overnight.      -Data reviewed today: I reviewed all new labs and imaging results over the last 24 hours. I personally reviewed no images or EKG's today.    Physical Exam   Temp: 98.3  F (36.8  C) Temp src: Oral BP: (!) 153/70 Pulse: 56   Resp: 16 SpO2: 97 % O2 Device: None (Room air)    There were no vitals filed for this visit.  Vital Signs with Ranges  Temp:  [97.3  F (36.3  C)-99.2  F (37.3  C)] 98.3  F (36.8  C)  Pulse:  [56-70] 56  Resp:  [16] 16  BP: (118-153)/(54-70) 153/70  SpO2:  [93 %-97 %] 97 %  I/O last 3 completed shifts:  In: 920 [P.O.:920]  Out: -     Constitutional: awake, alert, cooperative, no apparent distress, and appears stated age, some bruise on her forehead on right side.   Eyes: Lids and lashes normal, pupils equal, round and reactive to light, extra ocular muscles intact, sclera clear, conjunctiva normal  Respiratory: No increased work of breathing, good air exchange, clear to auscultation bilaterally, no crackles or wheezing  Cardiovascular: Normal apical impulse, regular rate and rhythm, normal S1 and S2, no S3 or S4, and no murmur noted  GI: No scars, normal bowel sounds, soft, non-distended, non-tender, no masses palpated, no hepatosplenomegally  Musculoskeletal: no lower extremity pitting edema present, bruises on the both knee. Left wrist in splint, no swelling.   Neurologic: no focal deficit. No  tremors. No rigidity, good facial expression and fluent speech     Medications       atenolol  150 mg Oral Daily     buPROPion  450 mg Oral QAM     carboxymethylcellulose PF  1 drop Left Eye BID     clotrimazole   Topical BID     clotrimazole-betamethasone   Topical BID     donepezil  5 mg Oral At Bedtime     fluconazole  150 mg Oral Once per day on Wed     furosemide  40 mg Oral Daily     [START ON 11/13/2020] levothyroxine  150 mcg Oral QAM AC     miconazole   Topical BID     multivitamin w/minerals  1 tablet Oral Daily     polyethylene glycol  17 g Oral Daily     senna-docusate  1 tablet Oral BID    Or     senna-docusate  2 tablet Oral BID     sertraline  200 mg Oral Daily     triamcinolone   Topical TID     Vitamin D3  125 mcg Oral Daily       Data   Recent Labs   Lab 11/12/20  0701 11/11/20  0557 11/10/20  1057   WBC  --  5.8 5.3   HGB  --  12.1 12.0   MCV  --  100 100   PLT  --  113* 126*     --  137   POTASSIUM 3.2*  --  3.4   CHLORIDE 104  --  104   CO2 29  --  31   BUN 23  --  26   CR 1.24*  --  1.22*   ANIONGAP 5  --  2*   PRASHANTH 9.0  --  9.2   GLC 88  --  99   ALBUMIN  --  3.2* 3.5   PROTTOTAL  --  6.8 7.3   BILITOTAL  --  0.9 0.6   ALKPHOS  --  83 82   ALT  --  22 20   AST  --  17 16   TROPI  --   --  <0.015       No results found for this or any previous visit (from the past 24 hour(s)).

## 2020-11-12 NOTE — PROGRESS NOTES
11/12/20 0944   Quick Adds   Type of Visit Initial Occupational Therapy Evaluation   Living Environment   People in home alone   Current Living Arrangements independent living facility   Home Accessibility no concerns   Transportation Anticipated family or friend will provide   Living Environment Comments Wants to get back to driving per pt   Self-Care   Usual Activity Tolerance good   Current Activity Tolerance fair   Equipment Currently Used at Home grab bar, tub/shower;cane, straight;shower chair;walker, rolling   Activity/Exercise/Self-Care Comment Pt uses 4WW at baseline at all times, receives A w/ bathing   Disability/Function   Hearing Difficulty or Deaf no   Wear Glasses or Blind yes   Vision Management glasses   Concentrating, Remembering or Making Decisions Difficulty no   Difficulty Communicating no   Difficulty Eating/Swallowing no   Walking or Climbing Stairs Difficulty yes   Walking or Climbing Stairs ambulation difficulty, requires equipment;stair climbing difficulty, requires equipment;transferring difficulty, requires equipment   Mobility Management 4WW   Dressing/Bathing Difficulty yes   Dressing/Bathing bathing difficulty, assistance 1 person   Dressing/Bathing Management aides at [facility] help her   Toileting no   Doing Errands Independently Difficulty (such as shopping) yes   Fall history within last six months yes   Number of times patient has fallen within last six months   (over 10)   Change in Functional Status Since Onset of Current Illness/Injury yes   General Information   Onset of Illness/Injury or Date of Surgery 11/10/20   Referring Physician Deion Jeronimo MD   Additional Occupational Profile Info/Pertinent History of Current Problem Fern Simons is a 75 year old female admitted on 11/10/2020 with fall, ROMELIA., and hallucinations. Suistained L distal radius fracture   Performance Patterns (Routines, Roles, Habits) Pt receieves A w/ bathing and all IADLs at home   Existing  Precautions/Restrictions fall;weight bearing  (LUE injury)   Limitations/Impairments safety/cognitive   Left Upper Extremity (Weight-bearing Status) non weight-bearing (NWB)   Cognitive Status Examination   Orientation Status orientation to person, place and time   Affect/Mental Status (Cognitive) WFL   Follows Commands follows one-step commands   Safety Deficit moderate deficit   Memory Deficit moderate deficit;short-term memory;working memory   Cognitive Status Comments Pt forgetful, poor short term recall. dementia diagnosis per chart   Visual Perception   Visual Impairment/Limitations corrective lenses full-time   Sensory   Sensory Quick Adds No deficits were identified   Pain Assessment   Patient Currently in Pain No   Integumentary/Edema   Integumentary/Edema no deficits were identifed   Posture   Posture forward head position   Range of Motion Comprehensive   General Range of Motion upper extremity range of motion deficits identified   General Upper Extremity Assessment (Range of Motion)   Upper Extremity: Range of Motion wrist, left: UE ROM   Comment: Upper Extremity ROM all other joints are WFL   Strength Comprehensive (MMT)   General Manual Muscle Testing (MMT) Assessment upper extremity strength deficits identified   Comment, General Manual Muscle Testing (MMT) Assessment LUE wrist is nonweight bearing   Upper Extremity (Manual Muscle Testing)   Upper Extremity: Manual Muscle Testing (MMT) left wrist strength deficit   Comment, MMT: Upper Extremity non weight bearing   Muscle Tone Assessment   Muscle Tone Quick Adds No deficits were identified   Bed Mobility   Bed Mobility rolling left;scooting/bridging   Rolling Left Reinholds (Bed Mobility) minimum assist (75% patient effort)   Scooting/Bridging Reinholds (Bed Mobility) minimum assist (75% patient effort)   Assistive Device (Bed Mobility) bed rails   Transfers   Transfers sit-stand transfer   Sit-Stand Transfer   Sit-Stand Reinholds (Transfers)  supervision   Assistive Device (Sit-Stand Transfers) walker, standard   Balance   Balance Assessment standing balance: dynamic   Standing Balance: Dynamic fair balance   Balance Comments steady on feet but will run into wall without VCs   Activities of Daily Living   BADL Assessment/Intervention bathing;lower body dressing   Bathing Assessment/Intervention   Bruno Level (Bathing) maximum assist (25% patient effort)   Assistive Devices (Bathing) shower chair   Position (Bathing) supported sitting   Lower Body Dressing Assessment/Training   Bruno Level (Lower Body Dressing) minimum assist (75% patient effort)   Instrumental Activities of Daily Living (IADL)   IADL Comments dependent w/ all IADLs   Clinical Impression   Criteria for Skilled Therapeutic Interventions Met (OT) yes   OT Diagnosis decrease safety/ind w/ ADLs and IADLs   OT Problem List-Impairments impacting ADL balance;cognition;mobility;range of motion (ROM);strength   Assessment of Occupational Performance 1-3 Performance Deficits   Identified Performance Deficits balance impairments and cognitive deficits decrease safety/ind w/ bathing/ LE dressing and all IADLs   Planned Therapy Interventions (OT) ADL retraining;cognition;strengthening;transfer training;progressive activity/exercise   Clinical Decision Making Complexity (OT) low complexity   Therapy Frequency (OT) 3x/week   Predicted Duration of Therapy 6 days   Risks and Benefits of Treatment have been explained. Yes   Patient, Family & other staff in agreement with plan of care Yes   OT Discharge Planning    OT Discharge Recommendation (DC Rec) Transitional Care Facility   OT Rationale for DC Rec Pt is below baseline and would benefit from continued rehab at [facility] to improve balance, strength, and activity tolerance w/ ADLs. If pt declines TCU, HHOT would be beneficial at [facility] to improve activity tolerance and balance   Total Evaluation Time (Minutes)   Total Evaluation Time (Minutes) 15

## 2020-11-12 NOTE — UTILIZATION REVIEW
Concurrent stay review; Secondary Review Determination     Under the authority of the Utilization Management Committee, the utilization review process indicated a secondary review on the above patient. The review outcome is based on review of the medical records, discussions with staff, and applying clinical experience noted on the date of the review.     (x) Observation Status Appropriate - Concurrent stay review     RATIONALE FOR DETERMINATION:  75 year old female from [facility] that presented with a fall. She has history of mild cognitive impairment, HTN, Hypothyroidism, Obesity, Depression.   The patient reportedly had 7-8x falls in the last week. She is in independent living and has her medications set up for her but she takes her self, and there is some concern on admit that 3 days of seroquel had been taken at once.  Neurology was consulted.  Per chart review, the patient is A&Ox3 today and is awaiting placement.  Given she remains in the hospital for disposition issues continued observation appears appropriate at this time.      Patient is clinically improving and there is no clear indication to change patient's status to inpatient. The severity of illness, intensity of service provided, expected LOS and risk for adverse outcome make the care appropriate for observation.     The information on this document is developed by the utilization review team in order for the business office to ensure compliance. This only denotes the appropriateness of proper admission status and does not reflect the quality of care rendered.   The definitions of Inpatient Status and Observation Status used in making the determination above are those provided in the CMS Coverage Manual, Chapter 1 and Chapter 6, section 70.4.     Sincerely,     Brian Dia MD  Utilization Review   Physician Advisor   [facility].

## 2020-11-12 NOTE — PLAN OF CARE
Patient alert and oriented, forgetful sometimes, standby assist, voids adequately, regular diet, powder and cream applied under the breast, and groins. Discharge pending TCU placement.

## 2020-11-12 NOTE — PROGRESS NOTES
Care Management Follow Up    Length of Stay (days): 0    Expected Discharge Date: 11/13/20     Concerns to be Addressed: cognitive/perceptual;decision making;home safety     Patient plan of care discussed at interdisciplinary rounds: Yes    Anticipated Discharge Disposition:       Anticipated Discharge Services:    Anticipated Discharge DME:      Patient/family educated on Medicare website which has current facility and service quality ratings: yes  Education Provided on the Discharge Plan:    Patient/Family in Agreement with the Plan:      Referrals Placed by CM/SW:    Private pay costs discussed: insurance costs out of pocket expenses    Additional Information:  Spoke to patient in her room. Provided [employer] TCU list. She would like to discharge home with added services. Discussed that TCU stay would likely be covered by her insurance and if she is to increase services at [facility] this would be out of pocket costs. CC LUCIANA Cassidy contacted facility who reported that at this time patient has limited services as out of pocket costs are concerns and they recommend TCU. Spoke to patient who stated that she would like TCU to be in [facility] or [place]. Due to [employer]'s limited list, referrals sent to [facility], Ad[facility] Cuevas and Ar[facility] De Anda via [facility].    Will continue to follow    MEJIA Davis  Inpatient   [facility]

## 2020-11-12 NOTE — PLAN OF CARE
Pt A/Ox2-3, forgetful. L wrist brace in place, and swelling to L hand is improving with elevation. PRN Tylenol given for mild pain. CMS intact. Skin pink/reddened around abdominal folds, breasts and mere area; powder and scheduled creams used. VSS on RA besides some slightly elevated BP. Ambulating A1 ww to the BR, denying SOB or dizziness. Adequate I/Os. Multiple abrasions and bruising to face, bilateral arms and legs. Discharge pending TCU placement.

## 2020-11-13 ENCOUNTER — APPOINTMENT (OUTPATIENT)
Dept: OCCUPATIONAL THERAPY | Facility: CLINIC | Age: 75
End: 2020-11-13
Payer: COMMERCIAL

## 2020-11-13 ENCOUNTER — APPOINTMENT (OUTPATIENT)
Dept: PHYSICAL THERAPY | Facility: CLINIC | Age: 75
End: 2020-11-13
Attending: PHYSICIAN ASSISTANT
Payer: COMMERCIAL

## 2020-11-13 LAB
ANION GAP SERPL CALCULATED.3IONS-SCNC: 7 MMOL/L (ref 3–14)
BUN SERPL-MCNC: 19 MG/DL (ref 7–30)
CALCIUM SERPL-MCNC: 9.4 MG/DL (ref 8.5–10.1)
CHLORIDE SERPL-SCNC: 106 MMOL/L (ref 94–109)
CO2 SERPL-SCNC: 26 MMOL/L (ref 20–32)
CREAT SERPL-MCNC: 1.11 MG/DL (ref 0.52–1.04)
GFR SERPL CREATININE-BSD FRML MDRD: 48 ML/MIN/{1.73_M2}
GLUCOSE SERPL-MCNC: 99 MG/DL (ref 70–99)
POTASSIUM SERPL-SCNC: 3.5 MMOL/L (ref 3.4–5.3)
SODIUM SERPL-SCNC: 139 MMOL/L (ref 133–144)

## 2020-11-13 PROCEDURE — 250N000013 HC RX MED GY IP 250 OP 250 PS 637: Performed by: PSYCHIATRY & NEUROLOGY

## 2020-11-13 PROCEDURE — 97530 THERAPEUTIC ACTIVITIES: CPT | Mod: GP | Performed by: PHYSICAL THERAPIST

## 2020-11-13 PROCEDURE — 80048 BASIC METABOLIC PNL TOTAL CA: CPT | Performed by: INTERNAL MEDICINE

## 2020-11-13 PROCEDURE — G0378 HOSPITAL OBSERVATION PER HR: HCPCS

## 2020-11-13 PROCEDURE — 97116 GAIT TRAINING THERAPY: CPT | Mod: GP,59 | Performed by: PHYSICAL THERAPIST

## 2020-11-13 PROCEDURE — 99225 PR SUBSEQUENT OBSERVATION CARE,LEVEL II: CPT | Performed by: INTERNAL MEDICINE

## 2020-11-13 PROCEDURE — 36415 COLL VENOUS BLD VENIPUNCTURE: CPT | Performed by: INTERNAL MEDICINE

## 2020-11-13 PROCEDURE — 97530 THERAPEUTIC ACTIVITIES: CPT | Mod: GO

## 2020-11-13 PROCEDURE — 250N000013 HC RX MED GY IP 250 OP 250 PS 637: Performed by: INTERNAL MEDICINE

## 2020-11-13 PROCEDURE — 99207 PR CDG-CODE CATEGORY CHANGED: CPT | Performed by: INTERNAL MEDICINE

## 2020-11-13 PROCEDURE — 250N000013 HC RX MED GY IP 250 OP 250 PS 637: Performed by: HOSPITALIST

## 2020-11-13 RX ADMIN — CARBOXYMETHYLCELLULOSE SODIUM 1 DROP: 10 GEL OPHTHALMIC at 22:16

## 2020-11-13 RX ADMIN — LEVOTHYROXINE SODIUM 150 MCG: 75 TABLET ORAL at 06:37

## 2020-11-13 RX ADMIN — MICONAZOLE NITRATE: 20 POWDER TOPICAL at 22:16

## 2020-11-13 RX ADMIN — MULTIPLE VITAMINS W/ MINERALS TAB 1 TABLET: TAB at 08:59

## 2020-11-13 RX ADMIN — ACETAMINOPHEN 650 MG: 325 TABLET, FILM COATED ORAL at 22:17

## 2020-11-13 RX ADMIN — DOCUSATE SODIUM 50 MG AND SENNOSIDES 8.6 MG 2 TABLET: 8.6; 5 TABLET, FILM COATED ORAL at 09:00

## 2020-11-13 RX ADMIN — MICONAZOLE NITRATE: 20 POWDER TOPICAL at 09:05

## 2020-11-13 RX ADMIN — ATENOLOL 150 MG: 50 TABLET ORAL at 08:59

## 2020-11-13 RX ADMIN — CLOTRIMAZOLE: 1 CREAM TOPICAL at 09:05

## 2020-11-13 RX ADMIN — FUROSEMIDE 40 MG: 40 TABLET ORAL at 08:59

## 2020-11-13 RX ADMIN — CHOLECALCIFEROL TAB 125 MCG (5000 UNIT) 125 MCG: 125 TAB at 08:59

## 2020-11-13 RX ADMIN — POLYETHYLENE GLYCOL 3350 17 G: 17 POWDER, FOR SOLUTION ORAL at 08:59

## 2020-11-13 RX ADMIN — TRIAMCINOLONE ACETONIDE: 1 CREAM TOPICAL at 09:05

## 2020-11-13 RX ADMIN — CLOTRIMAZOLE AND BETAMETHASONE DIPROPIONATE: 10; .5 CREAM TOPICAL at 09:05

## 2020-11-13 RX ADMIN — DOCUSATE SODIUM 50 MG AND SENNOSIDES 8.6 MG 1 TABLET: 8.6; 5 TABLET, FILM COATED ORAL at 22:17

## 2020-11-13 RX ADMIN — CARBOXYMETHYLCELLULOSE SODIUM 1 DROP: 10 GEL OPHTHALMIC at 08:59

## 2020-11-13 RX ADMIN — BUPROPION HYDROCHLORIDE 450 MG: 150 TABLET, EXTENDED RELEASE ORAL at 09:00

## 2020-11-13 RX ADMIN — SERTRALINE HYDROCHLORIDE 200 MG: 100 TABLET ORAL at 09:00

## 2020-11-13 RX ADMIN — DONEPEZIL HYDROCHLORIDE 5 MG: 5 TABLET, FILM COATED ORAL at 22:17

## 2020-11-13 RX ADMIN — TRIAMCINOLONE ACETONIDE: 1 CREAM TOPICAL at 17:46

## 2020-11-13 NOTE — PROGRESS NOTES
Observation goals PRIOR TO DISCHARGE     Comments: -diagnostic tests and consults completed and resulted: Goal Met  -vital signs normal or at patient baseline: Goal Met  Nurse to notify provider when observation goals have been met and patient is ready for discharge: Goal Met but Awaiting placement into TCU.

## 2020-11-13 NOTE — PLAN OF CARE
A&Ox4 but can be forgetful. Assist X1 with gait belt and walker. VSS on RA. L wrist non-surgical in Splint/brace. Prescription creams and powder applied to groin and under breasts. Denied Pain this shift. Tolerating Reg Diet. Discharge pending TCU placement. Will Cont to monitor.

## 2020-11-13 NOTE — PROGRESS NOTES
Care Management Follow Up    Length of Stay (days): 0    Expected Discharge Date: 11/14/20     Concerns to be Addressed: cognitive/perceptual;decision making;home safety     Patient plan of care discussed at interdisciplinary rounds: Yes    Anticipated Discharge Disposition:  TCU     Anticipated Discharge Services:    Anticipated Discharge DME:      Patient/family educated on Medicare website which has current facility and service quality ratings: yes  Education Provided on the Discharge Plan:  yes  Patient/Family in Agreement with the Plan:  yes    Referrals Placed by CM/SW:  [facility] and the [facility] pay costs discussed: Not applicable    Additional Information:  Call received from [facility] and pt is accepted there.  Pt approves of this plan and [facility] will request insurance auth from [facility].  Bed is available once auth is obtained.      Cherelle Vargas RN

## 2020-11-13 NOTE — PROGRESS NOTES
[facility]    Hospitalist Progress Note    Brief Summary:  Fern Simons is a 75 year old female from [facility] that presents with fall. She has history of Mild cognitive impairment, HTN, Hypothyroidism, Obesity, Depression.   The patient has had 7-8x falls in the last week. She is in independent living and has her medications set up for her but she takes her self, and there is some concern that 3 days of seroquel had been taken at once.      Assessment & Plan      Acute Metabolic Encephalopathy most likely secondary to Seroquel overdose: now resolved.    Delusions and hallucinations: resolved   Of note patient has been having increasing falls and increasing levels of hallucinations, paranoia, and cognitive decline as per report, she is on high doses of Seroquel 150 mg at night and 50 mg every morning.   Presented with fall. There was concern for Lewy body dementia and Parkinson's. Neurology was consulted.     On my exam  she is awake and alert, oriented X 3, reasonable memory, no resting tremor, no rigidity.  Gait no evaluated. I don't believe she has parkinson's.   I think her symptoms most likely related to Seroquel. No hallucination or paranoia at this time.   I will like to keep holding her Seroquel at this time and will discontinue at discharge. PT and OT to evaluate.   Neurology following appreciate input. PT is recommending TCU at this time.   She did well on my screening memory test, but with  OT   SW for disposition. Neurology has started her on Aricept today     Dementia  Possible Lewy body   OT cognitive assessment SLUMS shows score of 16 only. (1-20 suggest Dementia)  She is started on Aricept agree with that.   Avoid antipsychotic      Fall with left distal radial fracture  - in wrist splint, need 6 week of splint.   Orthopedic consulted, appreciate input.   NWB to left Upper extremity.   Follow up with orthopedic as out patient.        Hypothyroidism  -  resume levothyroxine, TSH check elevated, increase Levothyroxine to 150 mcg daily and recheck  TSH in 4 weeks.      HTN  - continue home atenolol     Depression  - sertaline     Awaiting placement at this time.      Diet:   regular  DVT Prophylaxis: Pneumatic Compression Devices  Code Status: No CPR- Do NOT Intubate    Disposition: Expected discharge awaiting placement.     Deion Jeronimo MD  Text Page  (7am - 6pm)    Interval History   No active complaints this morning.     No other significant event overnight.      -Data reviewed today: I reviewed all new labs and imaging results over the last 24 hours. I personally reviewed no images or EKG's today.    Physical Exam   Temp: 98.3  F (36.8  C) Temp src: Oral BP: 129/61 Pulse: 69   Resp: 16 SpO2: 96 % O2 Device: None (Room air)    There were no vitals filed for this visit.  Vital Signs with Ranges  Temp:  [97.9  F (36.6  C)-98.3  F (36.8  C)] 98.3  F (36.8  C)  Pulse:  [59-69] 69  Resp:  [15-16] 16  BP: (126-157)/(53-66) 129/61  SpO2:  [92 %-98 %] 96 %  I/O last 3 completed shifts:  In: 125 [P.O.:125]  Out: -     Constitutional: awake, alert, cooperative, no apparent distress, and appears stated age, some bruise on her forehead on right side.   Eyes: Lids and lashes normal, pupils equal, round and reactive to light, extra ocular muscles intact, sclera clear, conjunctiva normal  Respiratory: No increased work of breathing, good air exchange, clear to auscultation bilaterally, no crackles or wheezing  Cardiovascular: Normal apical impulse, regular rate and rhythm, normal S1 and S2, no S3 or S4, and no murmur noted  GI: No scars, normal bowel sounds, soft, non-distended, non-tender, no masses palpated, no hepatosplenomegally  Musculoskeletal: no lower extremity pitting edema present, bruises on the both knee. Left wrist in splint, no swelling.   Neurologic: no focal deficit. No tremors. No rigidity, good facial expression and fluent speech     Medications       atenolol   150 mg Oral Daily     buPROPion  450 mg Oral QAM     carboxymethylcellulose PF  1 drop Left Eye BID     clotrimazole   Topical BID     clotrimazole-betamethasone   Topical BID     donepezil  5 mg Oral At Bedtime     fluconazole  150 mg Oral Once per day on Wed     furosemide  40 mg Oral Daily     levothyroxine  150 mcg Oral QAM AC     miconazole   Topical BID     multivitamin w/minerals  1 tablet Oral Daily     polyethylene glycol  17 g Oral Daily     senna-docusate  1 tablet Oral BID    Or     senna-docusate  2 tablet Oral BID     sertraline  200 mg Oral Daily     triamcinolone   Topical TID     Vitamin D3  125 mcg Oral Daily       Data   Recent Labs   Lab 11/13/20  0857 11/12/20  0701 11/11/20  0557 11/10/20  1057   WBC  --   --  5.8 5.3   HGB  --   --  12.1 12.0   MCV  --   --  100 100   PLT  --   --  113* 126*    138  --  137   POTASSIUM 3.5 3.2*  --  3.4   CHLORIDE 106 104  --  104   CO2 26 29  --  31   BUN 19 23  --  26   CR 1.11* 1.24*  --  1.22*   ANIONGAP 7 5  --  2*   PRASHANTH 9.4 9.0  --  9.2   GLC 99 88  --  99   ALBUMIN  --   --  3.2* 3.5   PROTTOTAL  --   --  6.8 7.3   BILITOTAL  --   --  0.9 0.6   ALKPHOS  --   --  83 82   ALT  --   --  22 20   AST  --   --  17 16   TROPI  --   --   --  <0.015       No results found for this or any previous visit (from the past 24 hour(s)).

## 2020-11-13 NOTE — PLAN OF CARE
OT: Pt tolerated OT session well today. Plan to defer further OT treatment to TCU setting as pt w/ plan for TCU (accepted at [facility] but awaiting insurance auth). Rec pt to continue walking w/ staff throughout the day in the hallway to maintain functional act tolerance during remainder of hospital stay    Occupational Therapy Discharge Summary    Reason for therapy discharge:    defer further OT to TCU    Progress towards therapy goal(s). See goals on Care Plan in [facility] electronic health record for goal details.  Goals partially met.  Barriers to achieving goals:   n/a. Further OT deferred.    Therapy recommendation(s):    Continued therapy is recommended.  Rationale/Recommendations:  at TCU to improve ind/safety w/ ADL's and functional mobility.

## 2020-11-14 PROCEDURE — 250N000013 HC RX MED GY IP 250 OP 250 PS 637: Performed by: INTERNAL MEDICINE

## 2020-11-14 PROCEDURE — 250N000013 HC RX MED GY IP 250 OP 250 PS 637: Performed by: PSYCHIATRY & NEUROLOGY

## 2020-11-14 PROCEDURE — G0378 HOSPITAL OBSERVATION PER HR: HCPCS

## 2020-11-14 PROCEDURE — 99225 PR SUBSEQUENT OBSERVATION CARE,LEVEL II: CPT | Performed by: INTERNAL MEDICINE

## 2020-11-14 PROCEDURE — 99207 PR CDG-CODE CATEGORY CHANGED: CPT | Performed by: INTERNAL MEDICINE

## 2020-11-14 PROCEDURE — 250N000013 HC RX MED GY IP 250 OP 250 PS 637: Performed by: HOSPITALIST

## 2020-11-14 RX ADMIN — CLOTRIMAZOLE: 1 CREAM TOPICAL at 08:20

## 2020-11-14 RX ADMIN — DOCUSATE SODIUM 50 MG AND SENNOSIDES 8.6 MG 1 TABLET: 8.6; 5 TABLET, FILM COATED ORAL at 20:13

## 2020-11-14 RX ADMIN — CHOLECALCIFEROL TAB 125 MCG (5000 UNIT) 125 MCG: 125 TAB at 08:20

## 2020-11-14 RX ADMIN — ACETAMINOPHEN 650 MG: 325 TABLET, FILM COATED ORAL at 22:11

## 2020-11-14 RX ADMIN — CARBOXYMETHYLCELLULOSE SODIUM 1 DROP: 10 GEL OPHTHALMIC at 08:19

## 2020-11-14 RX ADMIN — ATENOLOL 150 MG: 50 TABLET ORAL at 08:19

## 2020-11-14 RX ADMIN — TRIAMCINOLONE ACETONIDE: 1 CREAM TOPICAL at 08:21

## 2020-11-14 RX ADMIN — FUROSEMIDE 40 MG: 40 TABLET ORAL at 08:19

## 2020-11-14 RX ADMIN — SERTRALINE HYDROCHLORIDE 200 MG: 100 TABLET ORAL at 08:20

## 2020-11-14 RX ADMIN — CARBOXYMETHYLCELLULOSE SODIUM 1 DROP: 10 GEL OPHTHALMIC at 20:14

## 2020-11-14 RX ADMIN — POLYETHYLENE GLYCOL 3350 17 G: 17 POWDER, FOR SOLUTION ORAL at 08:20

## 2020-11-14 RX ADMIN — DOCUSATE SODIUM 50 MG AND SENNOSIDES 8.6 MG 2 TABLET: 8.6; 5 TABLET, FILM COATED ORAL at 08:19

## 2020-11-14 RX ADMIN — MULTIPLE VITAMINS W/ MINERALS TAB 1 TABLET: TAB at 08:20

## 2020-11-14 RX ADMIN — DONEPEZIL HYDROCHLORIDE 5 MG: 5 TABLET, FILM COATED ORAL at 20:13

## 2020-11-14 RX ADMIN — LEVOTHYROXINE SODIUM 150 MCG: 75 TABLET ORAL at 06:48

## 2020-11-14 RX ADMIN — BUPROPION HYDROCHLORIDE 450 MG: 150 TABLET, EXTENDED RELEASE ORAL at 08:20

## 2020-11-14 RX ADMIN — CLOTRIMAZOLE AND BETAMETHASONE DIPROPIONATE: 10; .5 CREAM TOPICAL at 08:21

## 2020-11-14 RX ADMIN — MICONAZOLE NITRATE: 20 POWDER TOPICAL at 22:12

## 2020-11-14 RX ADMIN — MICONAZOLE NITRATE: 20 POWDER TOPICAL at 08:20

## 2020-11-14 NOTE — PROGRESS NOTES
Observation goals PRIOR TO DISCHARGE     Comments: -diagnostic tests and consults completed and resulted: Goal Met  -vital signs normal or at patient baseline: Goal Met  Nurse to notify provider when observation goals have been met and patient is ready for discharge: Goal met but waiting for placement.

## 2020-11-14 NOTE — PROGRESS NOTES
[facility]    Hospitalist Progress Note    Brief Summary:  Fern Simons is a 75 year old female from [facility] that presents with fall. She has history of Mild cognitive impairment, HTN, Hypothyroidism, Obesity, Depression.   The patient has had 7-8x falls in the last week. She is in independent living and has her medications set up for her but she takes her self, and there is some concern that 3 days of seroquel had been taken at once.      Assessment & Plan      Acute Metabolic Encephalopathy most likely secondary to Seroquel overdose: now resolved.    Delusions and hallucinations: resolved   Of note patient has been having increasing falls and increasing levels of hallucinations, paranoia, and cognitive decline as per report, she is on high doses of Seroquel 150 mg at night and 50 mg every morning.   Presented with fall. There was concern for Lewy body dementia and Parkinson's. Neurology was consulted.     On my exam  she is awake and alert, oriented X 3, reasonable memory, no resting tremor, no rigidity.  Gait no evaluated. I don't believe she has parkinson's.   I think her symptoms most likely related to Seroquel. No hallucination or paranoia at this time.   I will like to keep holding her Seroquel at this time and will discontinue at discharge. PT and OT to evaluate.   Neurology following appreciate input. PT is recommending TCU at this time.   SW for disposition. Neurology has started her on Aricept tolerating it well.     Dementia  Possible Lewy body   OT cognitive assessment SLUMS shows score of 16 only. (1-20 suggest Dementia)  She is started on Aricept agree with that.   Avoid antipsychotic      Fall with left distal radial fracture  - in wrist splint, need 6 week of splint.   Orthopedic consulted, appreciate input.   NWB to left Upper extremity.   Follow up with orthopedic as out patient.        Hypothyroidism  - resume levothyroxine, TSH check elevated,  increase Levothyroxine to 150 mcg daily and recheck  TSH in 4 weeks.      HTN  - continue home atenolol     Depression  - sertaline     Awaiting piror authorization from her insurance.      Diet:   regular  DVT Prophylaxis: Pneumatic Compression Devices  Code Status: No CPR- Do NOT Intubate    Disposition: Expected discharge awaiting placement.     Deion Jeronimo MD  Text Page  (7am - 6pm)    Interval History   Doing well, offer no complaints this morning.     No other significant event overnight.      -Data reviewed today: I reviewed all new labs and imaging results over the last 24 hours. I personally reviewed no images or EKG's today.    Physical Exam   Temp: 97.9  F (36.6  C) Temp src: Oral BP: (!) 152/87 Pulse: 60   Resp: 16 SpO2: 98 % O2 Device: None (Room air)    There were no vitals filed for this visit.  Vital Signs with Ranges  Temp:  [97.6  F (36.4  C)-98.3  F (36.8  C)] 97.9  F (36.6  C)  Pulse:  [60-62] 60  Resp:  [16] 16  BP: (121-152)/(55-87) 152/87  SpO2:  [96 %-100 %] 98 %  I/O last 3 completed shifts:  In: 200 [P.O.:200]  Out: -     Constitutional: awake, alert, cooperative, no apparent distress, and appears stated age, some bruise on her forehead on right side.   Eyes: Lids and lashes normal, pupils equal, round and reactive to light, extra ocular muscles intact, sclera clear, conjunctiva normal  Respiratory: No increased work of breathing, good air exchange, clear to auscultation bilaterally, no crackles or wheezing  Cardiovascular: Normal apical impulse, regular rate and rhythm, normal S1 and S2, no S3 or S4, and no murmur noted  GI: No scars, normal bowel sounds, soft, non-distended, non-tender, no masses palpated, no hepatosplenomegally  Musculoskeletal: no lower extremity pitting edema present, bruises on the both knee. Left wrist in splint, no swelling.   Neurologic: no focal deficit. No tremors. No rigidity, good facial expression and fluent speech     Medications       atenolol  150 mg Oral  Daily     buPROPion  450 mg Oral QAM     carboxymethylcellulose PF  1 drop Left Eye BID     clotrimazole   Topical BID     clotrimazole-betamethasone   Topical BID     donepezil  5 mg Oral At Bedtime     fluconazole  150 mg Oral Once per day on Wed     furosemide  40 mg Oral Daily     levothyroxine  150 mcg Oral QAM AC     miconazole   Topical BID     multivitamin w/minerals  1 tablet Oral Daily     polyethylene glycol  17 g Oral Daily     senna-docusate  1 tablet Oral BID    Or     senna-docusate  2 tablet Oral BID     sertraline  200 mg Oral Daily     triamcinolone   Topical TID     Vitamin D3  125 mcg Oral Daily       Data   Recent Labs   Lab 11/13/20  0857 11/12/20  0701 11/11/20  0557 11/10/20  1057   WBC  --   --  5.8 5.3   HGB  --   --  12.1 12.0   MCV  --   --  100 100   PLT  --   --  113* 126*    138  --  137   POTASSIUM 3.5 3.2*  --  3.4   CHLORIDE 106 104  --  104   CO2 26 29  --  31   BUN 19 23  --  26   CR 1.11* 1.24*  --  1.22*   ANIONGAP 7 5  --  2*   PRASHANTH 9.4 9.0  --  9.2   GLC 99 88  --  99   ALBUMIN  --   --  3.2* 3.5   PROTTOTAL  --   --  6.8 7.3   BILITOTAL  --   --  0.9 0.6   ALKPHOS  --   --  83 82   ALT  --   --  22 20   AST  --   --  17 16   TROPI  --   --   --  <0.015       No results found for this or any previous visit (from the past 24 hour(s)).

## 2020-11-14 NOTE — PLAN OF CARE
A&Ox4, forgetful. VSS on RA. Complaint of mild wrist pain, given PRN Tylenol x1. Up with assist of one. CMS intact. Splint in place on L wrist. Redness under breasts and groin folds, powder applied per pt request, declined use of medicated creams. Voiding adequately in bathroom. IV saline locked.

## 2020-11-15 PROCEDURE — 99225 PR SUBSEQUENT OBSERVATION CARE,LEVEL II: CPT | Performed by: INTERNAL MEDICINE

## 2020-11-15 PROCEDURE — G0378 HOSPITAL OBSERVATION PER HR: HCPCS

## 2020-11-15 PROCEDURE — 250N000013 HC RX MED GY IP 250 OP 250 PS 637: Performed by: HOSPITALIST

## 2020-11-15 PROCEDURE — 99207 PR CDG-CODE CATEGORY CHANGED: CPT | Performed by: INTERNAL MEDICINE

## 2020-11-15 PROCEDURE — 250N000013 HC RX MED GY IP 250 OP 250 PS 637: Performed by: INTERNAL MEDICINE

## 2020-11-15 PROCEDURE — 250N000013 HC RX MED GY IP 250 OP 250 PS 637: Performed by: PSYCHIATRY & NEUROLOGY

## 2020-11-15 RX ADMIN — CLOTRIMAZOLE: 1 CREAM TOPICAL at 21:13

## 2020-11-15 RX ADMIN — ACETAMINOPHEN 650 MG: 325 TABLET, FILM COATED ORAL at 21:11

## 2020-11-15 RX ADMIN — CARBOXYMETHYLCELLULOSE SODIUM 1 DROP: 10 GEL OPHTHALMIC at 21:09

## 2020-11-15 RX ADMIN — TRIAMCINOLONE ACETONIDE: 1 CREAM TOPICAL at 21:13

## 2020-11-15 RX ADMIN — MICONAZOLE NITRATE: 20 POWDER TOPICAL at 21:13

## 2020-11-15 RX ADMIN — FUROSEMIDE 40 MG: 40 TABLET ORAL at 08:32

## 2020-11-15 RX ADMIN — DOCUSATE SODIUM 50 MG AND SENNOSIDES 8.6 MG 2 TABLET: 8.6; 5 TABLET, FILM COATED ORAL at 08:32

## 2020-11-15 RX ADMIN — CLOTRIMAZOLE AND BETAMETHASONE DIPROPIONATE: 10; .5 CREAM TOPICAL at 21:12

## 2020-11-15 RX ADMIN — CARBOXYMETHYLCELLULOSE SODIUM 1 DROP: 10 GEL OPHTHALMIC at 08:32

## 2020-11-15 RX ADMIN — BUPROPION HYDROCHLORIDE 450 MG: 150 TABLET, EXTENDED RELEASE ORAL at 08:31

## 2020-11-15 RX ADMIN — MULTIPLE VITAMINS W/ MINERALS TAB 1 TABLET: TAB at 08:33

## 2020-11-15 RX ADMIN — ATENOLOL 150 MG: 50 TABLET ORAL at 08:31

## 2020-11-15 RX ADMIN — MICONAZOLE NITRATE: 20 POWDER TOPICAL at 08:33

## 2020-11-15 RX ADMIN — CHOLECALCIFEROL TAB 125 MCG (5000 UNIT) 125 MCG: 125 TAB at 08:32

## 2020-11-15 RX ADMIN — POLYETHYLENE GLYCOL 3350 17 G: 17 POWDER, FOR SOLUTION ORAL at 08:31

## 2020-11-15 RX ADMIN — LEVOTHYROXINE SODIUM 150 MCG: 75 TABLET ORAL at 06:37

## 2020-11-15 RX ADMIN — TRIAMCINOLONE ACETONIDE: 1 CREAM TOPICAL at 08:53

## 2020-11-15 RX ADMIN — DONEPEZIL HYDROCHLORIDE 5 MG: 5 TABLET, FILM COATED ORAL at 21:09

## 2020-11-15 RX ADMIN — SERTRALINE HYDROCHLORIDE 200 MG: 100 TABLET ORAL at 08:31

## 2020-11-15 NOTE — PROGRESS NOTES
Observation goals PRIOR TO DISCHARGE     Comments: -diagnostic tests and consults completed and resulted Met  -vital signs normal or at patient baseline Met    Waiting for placement    Alert and oriented x 4. VSS, on room air. Denies pain.  Up with assist of 1 and walker. Regular diet.  Voids in bathroom.  Non WB on LUE.  Redness under breasts and groin area.  Ambulated around nurse's station and hallway.  Discharge pending placement

## 2020-11-15 NOTE — PROGRESS NOTES
Observation goals PRIOR TO DISCHARGE     Comments: -diagnostic tests and consults completed and resulted: Goal Met  -vital signs normal or at patient baseline: Goal Met  Nurse to notify provider when observation goals have been met and patient is ready for discharge: Goal Met but waiting for insurance auth from [facility].

## 2020-11-15 NOTE — PROGRESS NOTES
Observation goals PRIOR TO DISCHARGE     Comments: -diagnostic tests and consults completed and resulted: Goal Met  -vital signs normal or at patient baseline: Goal Met  Nurse to notify provider when observation goals have been met and patient is ready for discharge: Goal Met but waiting for insurance auth from [facility].

## 2020-11-15 NOTE — PROGRESS NOTES
[facility]    Hospitalist Progress Note    Brief Summary:  Fern Simons is a 75 year old female from [facility] that presents with fall. She has history of Mild cognitive impairment, HTN, Hypothyroidism, Obesity, Depression.   The patient has had 7-8x falls in the last week. She is in independent living and has her medications set up for her but she takes her self, and there is some concern that 3 days of seroquel had been taken at once.      Assessment & Plan      Acute Metabolic Encephalopathy most likely secondary to Seroquel overdose: now resolved.    Delusions and hallucinations: resolved   Of note patient has been having increasing falls and increasing levels of hallucinations, paranoia, and cognitive decline as per report, she is on high doses of Seroquel 150 mg at night and 50 mg every morning.   Presented with fall. There was concern for Lewy body dementia and Parkinson's. Neurology was consulted.     On my exam  she is awake and alert, oriented X 3, reasonable memory, no resting tremor, no rigidity.   gait no evaluated. I don't believe she has parkinson's.   I think her symptoms most likely related to Seroquel. No hallucination or paranoia at this time.   I will like to keep holding her Seroquel at this time and will discontinue at discharge. PT and OT to evaluate.   Neurology following appreciate input. PT is recommending TCU at this time.   SW for disposition. Neurology has started her on Aricept tolerating it well.   Awaiting placement at this time. Overall remain stable.     Dementia  Possible Lewy body   OT cognitive assessment SLUMS shows score of 16 only. (1-20 suggest Dementia)  She is started on Aricept agree with that.   Avoid antipsychotic      Fall with left distal radial fracture  - in wrist splint, need 6 week of splint.   Orthopedic consulted, appreciate input.   NWB to left Upper extremity.   Follow up with orthopedic as out patient.         Hypothyroidism  - resume levothyroxine, TSH check elevated, increase Levothyroxine to 150 mcg daily and recheck  TSH in 4 weeks.      HTN  - continue home atenolol     Depression  - sertaline     Awaiting placement, overall remain stable at this time.      Diet:   regular  DVT Prophylaxis: Pneumatic Compression Devices  Code Status: No CPR- Do NOT Intubate    Disposition: Expected discharge awaiting placement.     Deion Jeronimo MD  Text Page  (7am - 6pm)    Interval History   Patient offer no complaints today, feeling well, good night sleep, denies any chest pain, SOB, fever, chills, nausea or vomiting at this time.     No other significant event overnight.      -Data reviewed today: I reviewed all new labs and imaging results over the last 24 hours. I personally reviewed no images or EKG's today.    Physical Exam   Temp: 97.9  F (36.6  C) Temp src: Oral BP: (!) 144/66 Pulse: 55   Resp: 16 SpO2: 98 % O2 Device: None (Room air)    There were no vitals filed for this visit.  Vital Signs with Ranges  Temp:  [97.7  F (36.5  C)-98.1  F (36.7  C)] 97.9  F (36.6  C)  Pulse:  [54-59] 55  Resp:  [14-16] 16  BP: (134-144)/(56-71) 144/66  SpO2:  [94 %-99 %] 98 %  No intake/output data recorded.    Constitutional: awake, alert, cooperative, no apparent distress, and appears stated age, some bruise on her forehead on right side.   Eyes: Lids and lashes normal, pupils equal, round and reactive to light, extra ocular muscles intact, sclera clear, conjunctiva normal  Respiratory: No increased work of breathing, good air exchange, clear to auscultation bilaterally, no crackles or wheezing  Cardiovascular: Normal apical impulse, regular rate and rhythm, normal S1 and S2, no S3 or S4, and no murmur noted  GI: No scars, normal bowel sounds, soft, non-distended, non-tender, no masses palpated, no hepatosplenomegally  Musculoskeletal: no lower extremity pitting edema present, bruises on the both knee. Left wrist in splint, no  swelling.   Neurologic: no focal deficit. No tremors. No rigidity, good facial expression and fluent speech     Medications       atenolol  150 mg Oral Daily     buPROPion  450 mg Oral QAM     carboxymethylcellulose PF  1 drop Left Eye BID     clotrimazole   Topical BID     clotrimazole-betamethasone   Topical BID     donepezil  5 mg Oral At Bedtime     fluconazole  150 mg Oral Once per day on Wed     furosemide  40 mg Oral Daily     levothyroxine  150 mcg Oral QAM AC     miconazole   Topical BID     multivitamin w/minerals  1 tablet Oral Daily     polyethylene glycol  17 g Oral Daily     senna-docusate  1 tablet Oral BID    Or     senna-docusate  2 tablet Oral BID     sertraline  200 mg Oral Daily     triamcinolone   Topical TID     Vitamin D3  125 mcg Oral Daily       Data   Recent Labs   Lab 11/13/20  0857 11/12/20  0701 11/11/20  0557 11/10/20  1057   WBC  --   --  5.8 5.3   HGB  --   --  12.1 12.0   MCV  --   --  100 100   PLT  --   --  113* 126*    138  --  137   POTASSIUM 3.5 3.2*  --  3.4   CHLORIDE 106 104  --  104   CO2 26 29  --  31   BUN 19 23  --  26   CR 1.11* 1.24*  --  1.22*   ANIONGAP 7 5  --  2*   PRASHANTH 9.4 9.0  --  9.2   GLC 99 88  --  99   ALBUMIN  --   --  3.2* 3.5   PROTTOTAL  --   --  6.8 7.3   BILITOTAL  --   --  0.9 0.6   ALKPHOS  --   --  83 82   ALT  --   --  22 20   AST  --   --  17 16   TROPI  --   --   --  <0.015       No results found for this or any previous visit (from the past 24 hour(s)).

## 2020-11-15 NOTE — PROGRESS NOTES
Care Management Follow Up    Length of Stay (days): 0    Expected Discharge Date: 11/14/20     Concerns to be Addressed: cognitive/perceptual;decision making;home safety     Patient plan of care discussed at interdisciplinary rounds: Yes    Anticipated Discharge Disposition:  TCU placement     Anticipated Discharge Services:  therapy  Anticipated Discharge DME:      Patient/family educated on Medicare website which has current facility and service quality ratings: yes  Education Provided on the Discharge Plan:  yes  Patient/Family in Agreement with the Plan:      Referrals Placed by CM/SW:  SNF referrals  Private pay costs discussed: Not applicable    Additional Information:  Call placed to [facility] to follow up to see if they received pre-auth from [employer].  Per Lizzette, they have not received pre-auth therefore patient is unable to discharge today.    Will continue to follow.      SAEID Henderson, [facility]  Lead   [phone]  [facility]

## 2020-11-15 NOTE — PLAN OF CARE
Pt is A&Ox4, VSS on RA. Up w/1, gait belt, and walker, voiding in bathroom. Regular diet. CMS intact, L wrist splint in place. No PRN pain medications given this shift. IV SL. Discharge pending TCU placement. Will continue to follow plan of care.

## 2020-11-16 ENCOUNTER — APPOINTMENT (OUTPATIENT)
Dept: PHYSICAL THERAPY | Facility: CLINIC | Age: 75
End: 2020-11-16
Payer: COMMERCIAL

## 2020-11-16 PROCEDURE — 250N000013 HC RX MED GY IP 250 OP 250 PS 637: Performed by: INTERNAL MEDICINE

## 2020-11-16 PROCEDURE — G0378 HOSPITAL OBSERVATION PER HR: HCPCS

## 2020-11-16 PROCEDURE — 97530 THERAPEUTIC ACTIVITIES: CPT | Mod: GP | Performed by: PHYSICAL THERAPY ASSISTANT

## 2020-11-16 PROCEDURE — 250N000013 HC RX MED GY IP 250 OP 250 PS 637: Performed by: PSYCHIATRY & NEUROLOGY

## 2020-11-16 PROCEDURE — 99207 PR CDG-CODE CATEGORY CHANGED: CPT | Performed by: INTERNAL MEDICINE

## 2020-11-16 PROCEDURE — 99224 PR SUBSEQUENT OBSERVATION CARE,LEVEL I: CPT | Performed by: INTERNAL MEDICINE

## 2020-11-16 PROCEDURE — 250N000013 HC RX MED GY IP 250 OP 250 PS 637: Performed by: HOSPITALIST

## 2020-11-16 PROCEDURE — 97116 GAIT TRAINING THERAPY: CPT | Mod: GP | Performed by: PHYSICAL THERAPY ASSISTANT

## 2020-11-16 RX ADMIN — CARBOXYMETHYLCELLULOSE SODIUM 1 DROP: 10 GEL OPHTHALMIC at 08:59

## 2020-11-16 RX ADMIN — TRIAMCINOLONE ACETONIDE: 1 CREAM TOPICAL at 09:00

## 2020-11-16 RX ADMIN — FUROSEMIDE 40 MG: 40 TABLET ORAL at 08:58

## 2020-11-16 RX ADMIN — BUPROPION HYDROCHLORIDE 450 MG: 150 TABLET, EXTENDED RELEASE ORAL at 08:59

## 2020-11-16 RX ADMIN — LEVOTHYROXINE SODIUM 150 MCG: 75 TABLET ORAL at 06:46

## 2020-11-16 RX ADMIN — CLOTRIMAZOLE: 1 CREAM TOPICAL at 20:14

## 2020-11-16 RX ADMIN — CLOTRIMAZOLE: 1 CREAM TOPICAL at 09:06

## 2020-11-16 RX ADMIN — SERTRALINE HYDROCHLORIDE 200 MG: 100 TABLET ORAL at 08:58

## 2020-11-16 RX ADMIN — TRIAMCINOLONE ACETONIDE: 1 CREAM TOPICAL at 20:14

## 2020-11-16 RX ADMIN — TRIAMCINOLONE ACETONIDE: 1 CREAM TOPICAL at 17:03

## 2020-11-16 RX ADMIN — MULTIPLE VITAMINS W/ MINERALS TAB 1 TABLET: TAB at 08:59

## 2020-11-16 RX ADMIN — CLOTRIMAZOLE AND BETAMETHASONE DIPROPIONATE: 10; .5 CREAM TOPICAL at 09:01

## 2020-11-16 RX ADMIN — ATENOLOL 150 MG: 50 TABLET ORAL at 08:59

## 2020-11-16 RX ADMIN — MICONAZOLE NITRATE: 20 POWDER TOPICAL at 09:00

## 2020-11-16 RX ADMIN — ACETAMINOPHEN 650 MG: 325 TABLET, FILM COATED ORAL at 17:02

## 2020-11-16 RX ADMIN — CLOTRIMAZOLE AND BETAMETHASONE DIPROPIONATE: 10; .5 CREAM TOPICAL at 20:14

## 2020-11-16 RX ADMIN — CHOLECALCIFEROL TAB 125 MCG (5000 UNIT) 125 MCG: 125 TAB at 08:58

## 2020-11-16 RX ADMIN — MICONAZOLE NITRATE: 20 POWDER TOPICAL at 20:14

## 2020-11-16 RX ADMIN — DONEPEZIL HYDROCHLORIDE 5 MG: 5 TABLET, FILM COATED ORAL at 20:15

## 2020-11-16 RX ADMIN — CARBOXYMETHYLCELLULOSE SODIUM 2 DROP: 10 GEL OPHTHALMIC at 20:15

## 2020-11-16 RX ADMIN — DOCUSATE SODIUM 50 MG AND SENNOSIDES 8.6 MG 1 TABLET: 8.6; 5 TABLET, FILM COATED ORAL at 20:15

## 2020-11-16 NOTE — PROGRESS NOTES
[facility]  Hospitalist Progress Note  Kash Diaz MD  11/16/2020    Assessment & Plan   Acute Metabolic Encephalopathy most likely secondary to Seroquel overdose: now resolved.    Delusions and hallucinations: resolved   Of note patient has been having increasing falls and increasing levels of hallucinations, paranoia, and cognitive decline as per report, she is on high doses of Seroquel 150 mg at night and 50 mg every morning.   Presented with fall. There was concern for Lewy body dementia and Parkinson's. Neurology was consulted.      On my exam  she is awake and alert, oriented X 3, reasonable memory, no resting tremor, no rigidity.   gait no evaluated. I don't believe she has parkinson's.   I think her symptoms most likely related to Seroquel. No hallucination or paranoia at this time.   I will like to keep holding her Seroquel at this time and will discontinue at discharge. PT and OT to evaluate.   Neurology following appreciate input. PT is recommending TCU at this time.   SW for disposition. Neurology has started her on Aricept tolerating it well.   Awaiting placement at this time. Overall remain stable.      Dementia  Possible Lewy body   OT cognitive assessment SLUMS shows score of 16 only. (1-20 suggest Dementia)  She is started on Aricept agree with that.   Avoid antipsychotic      Fall with left distal radial fracture  - in wrist splint, need 6 week of splint.   Orthopedic consulted, appreciate input.   NWB to left Upper extremity.   Follow up with orthopedic as out patient.         Hypothyroidism  - resume levothyroxine, TSH check elevated, increase Levothyroxine to 150 mcg daily and recheck  TSH in 4 weeks.      HTN  - continue home atenolol     Depression  - sertaline     Awaiting placement, overall remain stable at this time.      Diet:   regular  DVT Prophylaxis: Pneumatic Compression Devices  Code Status: No CPR- Do NOT Intubate     Disposition: awaiting insurance  authorization for placement.     Interval History   -- chart reviewed  -- awaiting insurance authorization    -Data reviewed today: I reviewed all new labs and imaging over the last 24 hours. I personally reviewed no images or EKG's today.    Physical Exam    , Blood pressure (!) 149/63, pulse 55, temperature 97.7  F (36.5  C), temperature source Oral, resp. rate 16, SpO2 96 %.  There were no vitals filed for this visit.  Vital Signs with Ranges  Temp:  [97.7  F (36.5  C)-98.2  F (36.8  C)] 97.7  F (36.5  C)  Pulse:  [55-60] 55  Resp:  [16] 16  BP: (144-149)/(59-63) 149/63  SpO2:  [93 %-96 %] 96 %  I/O's Last 24 hours  No intake/output data recorded.    Constitutional: Awake, alert, cooperative, no apparent distress  Respiratory: Clear to auscultation bilaterally, no crackles or wheezing  Cardiovascular: Regular rate and rhythm, normal S1 and S2, and no murmur noted  GI: Normal bowel sounds, soft, non-distended, non-tender  Skin/Integumen: No rashes, no cyanosis, no edema  Other:      Medications   All medications were reviewed.      atenolol  150 mg Oral Daily     buPROPion  450 mg Oral QAM     carboxymethylcellulose PF  1 drop Left Eye BID     clotrimazole   Topical BID     clotrimazole-betamethasone   Topical BID     donepezil  5 mg Oral At Bedtime     fluconazole  150 mg Oral Once per day on Wed     furosemide  40 mg Oral Daily     levothyroxine  150 mcg Oral QAM AC     miconazole   Topical BID     multivitamin w/minerals  1 tablet Oral Daily     polyethylene glycol  17 g Oral Daily     senna-docusate  1 tablet Oral BID    Or     senna-docusate  2 tablet Oral BID     sertraline  200 mg Oral Daily     triamcinolone   Topical TID     Vitamin D3  125 mcg Oral Daily        Data   Recent Labs   Lab 11/13/20  0857 11/12/20  0701 11/11/20  0557 11/10/20  1057   WBC  --   --  5.8 5.3   HGB  --   --  12.1 12.0   MCV  --   --  100 100   PLT  --   --  113* 126*    138  --  137   POTASSIUM 3.5 3.2*  --  3.4   CHLORIDE  106 104  --  104   CO2 26 29  --  31   BUN 19 23  --  26   CR 1.11* 1.24*  --  1.22*   ANIONGAP 7 5  --  2*   PRASHANTH 9.4 9.0  --  9.2   GLC 99 88  --  99   ALBUMIN  --   --  3.2* 3.5   PROTTOTAL  --   --  6.8 7.3   BILITOTAL  --   --  0.9 0.6   ALKPHOS  --   --  83 82   ALT  --   --  22 20   AST  --   --  17 16   TROPI  --   --   --  <0.015       No results found for this or any previous visit (from the past 24 hour(s)).    Kash Diaz MD  Text Page  (7am to 6pm)

## 2020-11-16 NOTE — PROGRESS NOTES
Pt alert and oriented x 4. C/O minimal pain on (L) arm, offered tylenol but refused. Up with SBA and ambulated in hallway. Bilateral groins/underneath breasts with rash and few open areas, cleaned areas and applied cream/powder as ordered. Awaiting insurance auth. Will cont to monitor.

## 2020-11-16 NOTE — PROGRESS NOTES
Care Management Follow Up    Length of Stay (days): 0    Expected Discharge Date: 11/14/20     Concerns to be Addressed: cognitive/perceptual;decision making;home safety     Patient plan of care discussed at interdisciplinary rounds: Yes    Anticipated Discharge Disposition:       Anticipated Discharge Services:    Anticipated Discharge DME:      Patient/family educated on Medicare website which has current facility and service quality ratings: yes  Education Provided on the Discharge Plan:    Patient/Family in Agreement with the Plan:      Referrals Placed by CM/SW:    Private pay costs discussed: Not applicable    Additional Information:  Spoke to Lizzette regarding [facility] requested updated PT notes. Spoke to therapy and requested they see patient.     Spoke to patient in her room and explained status of referral.     Will continue to follow    MEJIA Davis  Inpatient   [facility]

## 2020-11-16 NOTE — PROGRESS NOTES
Care Management Follow Up    Length of Stay (days): 0    Expected Discharge Date: 11/14/20     Concerns to be Addressed: cognitive/perceptual;decision making;home safety     Patient plan of care discussed at interdisciplinary rounds: Yes    Anticipated Discharge Disposition:       Anticipated Discharge Services:    Anticipated Discharge DME:      Patient/family educated on Medicare website which has current facility and service quality ratings: yes  Education Provided on the Discharge Plan:    Patient/Family in Agreement with the Plan:      Referrals Placed by CM/SW:    Private pay costs discussed: Not applicable    Additional Information:  Call from [facility] that a Peer to Peer has been requested from [email or web address]. Call [phone], option 5. Reference #[id number]. Needs to be completed by 11am 11/17/20. Bedside nurse updated.    Will continue to follow    MEJIA Davis  Inpatient   [facility]

## 2020-11-16 NOTE — PROGRESS NOTES
OBSERVATION GOALS:     -diagnostic tests and consults completed and resulted: Met  -vital signs normal or at patient baseline: Met     Goals met, just waiting for insurance authorization from [facility]

## 2020-11-16 NOTE — PROGRESS NOTES
OBSERVATION GOALS:      - diagnostic tests and consults completed and resulted: Met  - vital signs normal or at patient baseline: Met    Pt A/O x4. CMS intact. VSS on RA besides some slightly elevated BP. Up A1 ww; moving well. Voiding adequately via BR. Denies pain. Goals met, just waiting for insurance authorization from [facility].

## 2020-11-17 PROCEDURE — 250N000013 HC RX MED GY IP 250 OP 250 PS 637: Performed by: HOSPITALIST

## 2020-11-17 PROCEDURE — 250N000013 HC RX MED GY IP 250 OP 250 PS 637: Performed by: INTERNAL MEDICINE

## 2020-11-17 PROCEDURE — 99207 PR CDG-CODE CATEGORY CHANGED: CPT | Performed by: INTERNAL MEDICINE

## 2020-11-17 PROCEDURE — 99225 PR SUBSEQUENT OBSERVATION CARE,LEVEL II: CPT | Performed by: INTERNAL MEDICINE

## 2020-11-17 PROCEDURE — 250N000013 HC RX MED GY IP 250 OP 250 PS 637: Performed by: PSYCHIATRY & NEUROLOGY

## 2020-11-17 PROCEDURE — G0378 HOSPITAL OBSERVATION PER HR: HCPCS

## 2020-11-17 RX ORDER — LABETALOL HYDROCHLORIDE 5 MG/ML
20 INJECTION, SOLUTION INTRAVENOUS ONCE
Status: DISCONTINUED | OUTPATIENT
Start: 2020-11-17 | End: 2020-11-17

## 2020-11-17 RX ADMIN — MICONAZOLE NITRATE: 20 POWDER TOPICAL at 09:57

## 2020-11-17 RX ADMIN — CLOTRIMAZOLE: 1 CREAM TOPICAL at 22:00

## 2020-11-17 RX ADMIN — CLOTRIMAZOLE: 1 CREAM TOPICAL at 09:57

## 2020-11-17 RX ADMIN — BUPROPION HYDROCHLORIDE 450 MG: 150 TABLET, EXTENDED RELEASE ORAL at 09:45

## 2020-11-17 RX ADMIN — FUROSEMIDE 40 MG: 40 TABLET ORAL at 09:45

## 2020-11-17 RX ADMIN — LEVOTHYROXINE SODIUM 150 MCG: 75 TABLET ORAL at 06:30

## 2020-11-17 RX ADMIN — DONEPEZIL HYDROCHLORIDE 5 MG: 5 TABLET, FILM COATED ORAL at 21:59

## 2020-11-17 RX ADMIN — CLOTRIMAZOLE AND BETAMETHASONE DIPROPIONATE: 10; .5 CREAM TOPICAL at 22:00

## 2020-11-17 RX ADMIN — SERTRALINE HYDROCHLORIDE 200 MG: 100 TABLET ORAL at 09:45

## 2020-11-17 RX ADMIN — DOCUSATE SODIUM 50 MG AND SENNOSIDES 8.6 MG 1 TABLET: 8.6; 5 TABLET, FILM COATED ORAL at 09:44

## 2020-11-17 RX ADMIN — CHOLECALCIFEROL TAB 125 MCG (5000 UNIT) 125 MCG: 125 TAB at 09:45

## 2020-11-17 RX ADMIN — CLOTRIMAZOLE AND BETAMETHASONE DIPROPIONATE: 10; .5 CREAM TOPICAL at 09:57

## 2020-11-17 RX ADMIN — ATENOLOL 150 MG: 50 TABLET ORAL at 09:44

## 2020-11-17 RX ADMIN — TRIAMCINOLONE ACETONIDE: 1 CREAM TOPICAL at 09:57

## 2020-11-17 RX ADMIN — CARBOXYMETHYLCELLULOSE SODIUM 1 DROP: 10 GEL OPHTHALMIC at 09:45

## 2020-11-17 RX ADMIN — TRIAMCINOLONE ACETONIDE: 1 CREAM TOPICAL at 22:00

## 2020-11-17 RX ADMIN — MULTIPLE VITAMINS W/ MINERALS TAB 1 TABLET: TAB at 09:45

## 2020-11-17 RX ADMIN — CARBOXYMETHYLCELLULOSE SODIUM 1 DROP: 10 GEL OPHTHALMIC at 21:59

## 2020-11-17 RX ADMIN — MICONAZOLE NITRATE: 20 POWDER TOPICAL at 22:00

## 2020-11-17 RX ADMIN — TRIAMCINOLONE ACETONIDE: 1 CREAM TOPICAL at 16:39

## 2020-11-17 NOTE — PLAN OF CARE
A&O.  VSS, RA.  CMS intact.  Headache managed with tylenol.  Up with 1 and walker.  Rash under abd fold and abdomen improving.  Discharge pending.

## 2020-11-17 NOTE — PROGRESS NOTES
[facility]  Hospitalist Progress Note  Kash Diaz MD  11/17/2020    Assessment & Plan   Acute Metabolic Encephalopathy most likely secondary to Seroquel overdose: now resolved.    Delusions and hallucinations: resolved   Of note patient has been having increasing falls and increasing levels of hallucinations, paranoia, and cognitive decline as per report, she is on high doses of Seroquel 150 mg at night and 50 mg every morning.   Presented with fall. There was concern for Lewy body dementia and Parkinson's. Neurology was consulted.   -- she is awake and alert, oriented X 3, reasonable memory, no resting tremor, no rigidity.  -- her symptoms most likely related to Seroquel. No hallucination or paranoia at this time.   --  keep holding her Seroquel at this time and will discontinue at discharge. PT and OT to evaluate.   Neurology following appreciate input. PT is recommending TCU at this time.   SW for disposition. Neurology has started her on Aricept tolerating it well.   Awaiting placement at this time. Overall remain stable.      Dementia  Possible Lewy body   OT cognitive assessment SLUMS shows score of 16 only. (1-20 suggest Dementia)  She is started on Aricept agree with that.   Avoid antipsychotic      Fall with left distal radial fracture  - in wrist splint, need 6 week of splint.   Orthopedic consulted, appreciate input.   NWB to left Upper extremity.   Follow up with orthopedic as out patient.      Hypothyroidism  - resume levothyroxine, TSH check elevated, increase Levothyroxine to 150 mcg daily and recheck  TSH in 4 weeks.      HTN  - continue home atenolol     Depression  - sertaline     Awaiting placement, overall remain stable at this time.      Diet:   regular  DVT Prophylaxis: Pneumatic Compression Devices  Code Status: No CPR- Do NOT Intubate     Disposition: awaiting insurance authorization for placement.     Interval History   -- chart reviewed  -- awaiting  insurance authorization  -- spoke with [facility] medical office who told me the patient doesn't have a skilled need for TCU approval  -- patient is appealing this.    -Data reviewed today: I reviewed all new labs and imaging over the last 24 hours. I personally reviewed no images or EKG's today.    Physical Exam    , Blood pressure (!) 148/61, pulse 56, temperature 97.8  F (36.6  C), temperature source Oral, resp. rate 16, SpO2 94 %.  There were no vitals filed for this visit.  Vital Signs with Ranges  Temp:  [97.4  F (36.3  C)-97.8  F (36.6  C)] 97.8  F (36.6  C)  Pulse:  [54-62] 56  Resp:  [16] 16  BP: (134-160)/(58-74) 148/61  SpO2:  [94 %-96 %] 94 %  I/O's Last 24 hours  No intake/output data recorded.    Constitutional: Awake, alert, cooperative, no apparent distress  Respiratory: Clear to auscultation bilaterally, no crackles or wheezing  Cardiovascular: Regular rate and rhythm, normal S1 and S2, and no murmur noted  GI: Normal bowel sounds, soft, non-distended, non-tender  Skin/Integumen: No rashes, no cyanosis, no edema  Other:      Medications   All medications were reviewed.      atenolol  150 mg Oral Daily     buPROPion  450 mg Oral QAM     carboxymethylcellulose PF  1 drop Left Eye BID     clotrimazole   Topical BID     clotrimazole-betamethasone   Topical BID     donepezil  5 mg Oral At Bedtime     fluconazole  150 mg Oral Once per day on Wed     furosemide  40 mg Oral Daily     levothyroxine  150 mcg Oral QAM AC     miconazole   Topical BID     multivitamin w/minerals  1 tablet Oral Daily     polyethylene glycol  17 g Oral Daily     senna-docusate  1 tablet Oral BID    Or     senna-docusate  2 tablet Oral BID     sertraline  200 mg Oral Daily     triamcinolone   Topical TID     Vitamin D3  125 mcg Oral Daily        Data   Recent Labs   Lab 11/13/20  0857 11/12/20  0701 11/11/20  0557   WBC  --   --  5.8   HGB  --   --  12.1   MCV  --   --  100   PLT  --   --  113*    138  --    POTASSIUM 3.5 3.2*   --    CHLORIDE 106 104  --    CO2 26 29  --    BUN 19 23  --    CR 1.11* 1.24*  --    ANIONGAP 7 5  --    PRASHANTH 9.4 9.0  --    GLC 99 88  --    ALBUMIN  --   --  3.2*   PROTTOTAL  --   --  6.8   BILITOTAL  --   --  0.9   ALKPHOS  --   --  83   ALT  --   --  22   AST  --   --  17       No results found for this or any previous visit (from the past 24 hour(s)).    Kash Diaz MD  Text Page  (7am to 6pm)

## 2020-11-17 NOTE — PROGRESS NOTES
Pt A&Ox4. CMS intact. VSS. Up w/ A1 to BR. Taking tylenol for pain. Voiding adequately. Discharge pending prior auth. Continue to monitor.

## 2020-11-17 NOTE — PLAN OF CARE
Pt AO (forgetful), VSS on RA. Denies pain. Tolerating regular diet. Up SBA to BR using platform walker. NWB LUE. Brace to L wrist. CMS intact. Creams applied to panus/breasts. [employer] declined pt insurance, SW working on alternatives. Continue to monitor.     Addendum: No changes this gael.

## 2020-11-17 NOTE — PROGRESS NOTES
Care Management Follow Up    Length of Stay (days): 0    Expected Discharge Date: 11/14/20     Concerns to be Addressed: cognitive/perceptual;decision making;home safety     Patient plan of care discussed at interdisciplinary rounds: Yes    Private pay costs discussed: Not applicable    Additional Information:  Adarsh called to let SW know that after Peer to  Peer, patients [employer] insurance declined TCU coverage.   Patient was explained that her coverage was denied and that she needs to call to appeal which she is doing. Phone number is [phone], given to patient.   Spoke with Danielle at [facility] who understands this is a complicated situation as the Peer to Peer was already declined. Discussed and sent Danielle an email that was sent regarding [employer] not needing prior authorization until December 31st, 2020.   Patient is calling [facility] to appeal. Discussed the possibility that patient may have to go home with home care if appeal is denied however patient stated that the amount of PT provided in the past was not enough in the home.     14:23: Patient given the number to call for the appeal process as well as her insurance information.     16:44: Per patient, [employer] denied her appeal after Peer to Peer conducted. Per patient, MD could call [phone] to assist with recommendations for patient needing TCU.         SAEID George

## 2020-11-17 NOTE — PROGRESS NOTES
CLINICAL NUTRITION SERVICES BRIEF NOTE  Chart reviewed for LOS policy. Noted patient is OBS status and awaiting placement    New Findings  - eating % meals over the past couple days. No weight loss indicated. Good appetite recorded in I/O.   Wt Readings from Last 10 Encounters:   08/18/20 99.8 kg (220 lb)   01/18/19 99.8 kg (220 lb)   05/01/18 90.7 kg (200 lb)       Intervention  - No assessment/intervention planned at this time, given OBS status. Patient is stable. Please consult if needs arise.     Stacey Nassar RD, LD  [facility] [facility], [facility]   Pager: [phone]  Weekend Pager: [phone]

## 2020-11-17 NOTE — PROGRESS NOTES
MD Notification    Notified Person: MD    Notified Person Name: Joe    Notification Date/Time: 11/17 1027    Notification Interaction: page    Purpose of Notification: Were you able to do the peer to peer thing for [employer]? Please let me know.     Orders Received:    Comments:

## 2020-11-18 ENCOUNTER — APPOINTMENT (OUTPATIENT)
Dept: PHYSICAL THERAPY | Facility: CLINIC | Age: 75
End: 2020-11-18
Payer: COMMERCIAL

## 2020-11-18 PROCEDURE — 250N000013 HC RX MED GY IP 250 OP 250 PS 637: Performed by: INTERNAL MEDICINE

## 2020-11-18 PROCEDURE — G0378 HOSPITAL OBSERVATION PER HR: HCPCS

## 2020-11-18 PROCEDURE — 250N000013 HC RX MED GY IP 250 OP 250 PS 637: Performed by: HOSPITALIST

## 2020-11-18 PROCEDURE — 97530 THERAPEUTIC ACTIVITIES: CPT | Mod: GP | Performed by: PHYSICAL THERAPY ASSISTANT

## 2020-11-18 PROCEDURE — 97116 GAIT TRAINING THERAPY: CPT | Mod: GP | Performed by: PHYSICAL THERAPY ASSISTANT

## 2020-11-18 PROCEDURE — 99224 PR SUBSEQUENT OBSERVATION CARE,LEVEL I: CPT | Performed by: INTERNAL MEDICINE

## 2020-11-18 PROCEDURE — 250N000013 HC RX MED GY IP 250 OP 250 PS 637: Performed by: PSYCHIATRY & NEUROLOGY

## 2020-11-18 PROCEDURE — 99207 PR CDG-CODE CATEGORY CHANGED: CPT | Performed by: INTERNAL MEDICINE

## 2020-11-18 RX ADMIN — MICONAZOLE NITRATE: 20 POWDER TOPICAL at 20:52

## 2020-11-18 RX ADMIN — CARBOXYMETHYLCELLULOSE SODIUM 2 DROP: 10 GEL OPHTHALMIC at 20:53

## 2020-11-18 RX ADMIN — MICONAZOLE NITRATE: 20 POWDER TOPICAL at 10:05

## 2020-11-18 RX ADMIN — TRIAMCINOLONE ACETONIDE: 1 CREAM TOPICAL at 10:05

## 2020-11-18 RX ADMIN — ATENOLOL 150 MG: 50 TABLET ORAL at 10:02

## 2020-11-18 RX ADMIN — CLOTRIMAZOLE: 1 CREAM TOPICAL at 10:06

## 2020-11-18 RX ADMIN — LEVOTHYROXINE SODIUM 150 MCG: 75 TABLET ORAL at 06:49

## 2020-11-18 RX ADMIN — CARBOXYMETHYLCELLULOSE SODIUM 1 DROP: 10 GEL OPHTHALMIC at 10:02

## 2020-11-18 RX ADMIN — CHOLECALCIFEROL TAB 125 MCG (5000 UNIT) 125 MCG: 125 TAB at 10:01

## 2020-11-18 RX ADMIN — TRIAMCINOLONE ACETONIDE: 1 CREAM TOPICAL at 20:52

## 2020-11-18 RX ADMIN — BUPROPION HYDROCHLORIDE 450 MG: 150 TABLET, EXTENDED RELEASE ORAL at 10:02

## 2020-11-18 RX ADMIN — SERTRALINE HYDROCHLORIDE 200 MG: 100 TABLET ORAL at 10:02

## 2020-11-18 RX ADMIN — FLUCONAZOLE 150 MG: 150 TABLET ORAL at 10:45

## 2020-11-18 RX ADMIN — MULTIPLE VITAMINS W/ MINERALS TAB 1 TABLET: TAB at 10:02

## 2020-11-18 RX ADMIN — FUROSEMIDE 40 MG: 40 TABLET ORAL at 10:01

## 2020-11-18 RX ADMIN — DONEPEZIL HYDROCHLORIDE 5 MG: 5 TABLET, FILM COATED ORAL at 20:53

## 2020-11-18 RX ADMIN — CLOTRIMAZOLE: 1 CREAM TOPICAL at 20:52

## 2020-11-18 RX ADMIN — CLOTRIMAZOLE AND BETAMETHASONE DIPROPIONATE: 10; .5 CREAM TOPICAL at 10:06

## 2020-11-18 RX ADMIN — CLOTRIMAZOLE AND BETAMETHASONE DIPROPIONATE: 10; .5 CREAM TOPICAL at 20:53

## 2020-11-18 NOTE — PROGRESS NOTES
diagnostic tests and consults completed and resulted : goal met  -vital signs normal or at patient baseline : goal met

## 2020-11-18 NOTE — PROGRESS NOTES
[facility]  Hospitalist Progress Note  Kash Diaz MD  11/18/2020    Assessment & Plan   Acute Metabolic Encephalopathy most likely secondary to Seroquel overdose: now resolved.    Delusions and hallucinations: resolved   Of note patient has been having increasing falls and increasing levels of hallucinations, paranoia, and cognitive decline as per report, she is on high doses of Seroquel 150 mg at night and 50 mg every morning.   Presented with fall. There was concern for Lewy body dementia and Parkinson's. Neurology was consulted.   -- she is awake and alert, oriented X 3, reasonable memory, no resting tremor, no rigidity.  -- her symptoms most likely related to Seroquel. No hallucination or paranoia at this time.   --  keep holding her Seroquel at this time and will discontinue at discharge. PT and OT to evaluate.   Neurology following appreciate input. PT is recommending TCU at this time.   SW for disposition. Neurology has started her on Aricept tolerating it well.   [employer] declined TCU placement  Discharge home with home therapies     Dementia  Possible Lewy body   OT cognitive assessment SLUMS shows score of 16 only. (1-20 suggest Dementia)  She is started on Aricept agree with that.   Avoid antipsychotic      Fall with left distal radial fracture  - in wrist splint, need 6 week of splint.   Orthopedic consulted, appreciate input.   NWB to left Upper extremity.   Follow up with orthopedic as out patient.      Hypothyroidism  - resume levothyroxine, TSH check elevated, increase Levothyroxine to 150 mcg daily and recheck  TSH in 4 weeks.      HTN  - continue home atenolol     Depression  - sertaline     Awaiting placement, overall remain stable at this time.      Diet:   regular  DVT Prophylaxis: Pneumatic Compression Devices  Code Status: No CPR- Do NOT Intubate     Disposition:   -- home tomorrow with home therapies    Interval History   -- no acute overnight events  -- [employer]  denied appeal again    -Data reviewed today: I reviewed all new labs and imaging over the last 24 hours. I personally reviewed no images or EKG's today.    Physical Exam    , Blood pressure (!) 142/59, pulse 64, temperature 98.2  F (36.8  C), temperature source Oral, resp. rate 16, SpO2 92 %.  There were no vitals filed for this visit.  Vital Signs with Ranges  Temp:  [98.2  F (36.8  C)] 98.2  F (36.8  C)  Pulse:  [61-65] 64  Resp:  [16] 16  BP: (138-146)/(55-61) 142/59  SpO2:  [91 %-94 %] 92 %  I/O's Last 24 hours  I/O last 3 completed shifts:  In: 120 [P.O.:120]  Out: -     Constitutional: Awake, alert, cooperative, no apparent distress  Respiratory: Clear to auscultation bilaterally, no crackles or wheezing  Cardiovascular: Regular rate and rhythm, normal S1 and S2, and no murmur noted  GI: Normal bowel sounds, soft, non-distended, non-tender  Skin/Integumen: No rashes, no cyanosis, no edema  Other:      Medications   All medications were reviewed.      atenolol  150 mg Oral Daily     buPROPion  450 mg Oral QAM     carboxymethylcellulose PF  1 drop Left Eye BID     clotrimazole   Topical BID     clotrimazole-betamethasone   Topical BID     donepezil  5 mg Oral At Bedtime     fluconazole  150 mg Oral Once per day on Wed     furosemide  40 mg Oral Daily     levothyroxine  150 mcg Oral QAM AC     miconazole   Topical BID     multivitamin w/minerals  1 tablet Oral Daily     polyethylene glycol  17 g Oral Daily     senna-docusate  1 tablet Oral BID    Or     senna-docusate  2 tablet Oral BID     sertraline  200 mg Oral Daily     triamcinolone   Topical TID     Vitamin D3  125 mcg Oral Daily        Data   Recent Labs   Lab 11/13/20  0857 11/12/20  0701    138   POTASSIUM 3.5 3.2*   CHLORIDE 106 104   CO2 26 29   BUN 19 23   CR 1.11* 1.24*   ANIONGAP 7 5   PRASHANTH 9.4 9.0   GLC 99 88       No results found for this or any previous visit (from the past 24 hour(s)).    Kash Diaz MD  Text Page  (7am to 6pm)

## 2020-11-18 NOTE — PROGRESS NOTES
diagnostic tests and consults completed and resulted : goal met  -vital signs normal or at patient baseline : goal met      Pt a&ox4 - can be forgetful at times, up with 1 gb and platform walker - pt tends to drift to the left when walking, L wrist brace in place - NWB LUE, rash in skin folds, regular diet, voiding in BR, denies pain, plan for TCU at discharge but having pre auth issues. Patient slept between cares.

## 2020-11-18 NOTE — PROGRESS NOTES
Care Management Follow Up    Length of Stay (days): 0    Expected Discharge Date: 11/18/20     Concerns to be Addressed: cognitive/perceptual;decision making;home safety     Patient plan of care discussed at interdisciplinary rounds: Yes    Anticipated Discharge Disposition:       Anticipated Discharge Services:    Anticipated Discharge DME:      Patient/family educated on Medicare website which has current facility and service quality ratings: yes  Education Provided on the Discharge Plan:    Patient/Family in Agreement with the Plan:      Referrals Placed by CM/SW:    Private pay costs discussed: insurance costs [employer] denial    Additional Information:  Patient has requested that writer [facility] Discussed peer to peer denial and appeal denial. Patient insisted, asking if  Would call today. Page placed to Dr. Diaz with this request.     Call placed to [facility] regarding options regarding private pay status. Patient can private pay with $4000 up front. There are no beds until Friday/Saturday at this facility.     [facility] Writer informed LUCIANA Granda that patient was denied by [facility]. Asked if patient could come back with increase in services. Was told that 4 visits per day plus medication management is an additional $1315/month. 6 visits per day plus medication management is $1958/month. Spoke to patient in her room and explained. She is going to contact her brother who assists her with finances and discuss. She will direct him to writer if he has questions. Writer explained that a decision will need to be made today based on bed availability and medical stability.     1440: Spoke to patient regarding plan. She has has talked to her brother and sister and they are assisting her with setting up additional services at her apartment. Writer told patient that a call would be made to Kalee at her  apartment requesting that she contact writer when plans have been made. Patient stated that she may have a ride at discharge. Call made to Kalee and message left.    Will continue to follow    MEJIA Davis  Inpatient   [facility]

## 2020-11-18 NOTE — PLAN OF CARE
Summary:     Date/Time 11/18 7-3:30pm    Service: Trauma  Behavior/Aggression tool color: Green  Diagnosis: Increased confusion, frequent falls, non-displaced L wrist fx  POD#: NA  Mental Status: AxOx4 - forgetful at times  Activity/dangle: A1 platform walker   Diet: Regular  Pain: denies  [place]/Voiding: BR   Tele/Restraints/Iso:   02/LDA: RA, JOSE ALFREDO SL  D/C Date: TBD - pending insurance authorization ([employer])  Other Info: Encouraged pt to ambulate to and form BR and also walked around the nursing station today.

## 2020-11-19 PROCEDURE — 99224 PR SUBSEQUENT OBSERVATION CARE,LEVEL I: CPT | Performed by: INTERNAL MEDICINE

## 2020-11-19 PROCEDURE — 250N000013 HC RX MED GY IP 250 OP 250 PS 637: Performed by: HOSPITALIST

## 2020-11-19 PROCEDURE — 99207 PR CDG-CODE CATEGORY CHANGED: CPT | Performed by: INTERNAL MEDICINE

## 2020-11-19 PROCEDURE — G0378 HOSPITAL OBSERVATION PER HR: HCPCS

## 2020-11-19 PROCEDURE — 250N000013 HC RX MED GY IP 250 OP 250 PS 637: Performed by: PSYCHIATRY & NEUROLOGY

## 2020-11-19 PROCEDURE — 250N000013 HC RX MED GY IP 250 OP 250 PS 637: Performed by: INTERNAL MEDICINE

## 2020-11-19 RX ORDER — DONEPEZIL HYDROCHLORIDE 5 MG/1
5 TABLET, FILM COATED ORAL AT BEDTIME
Qty: 28 TABLET | Refills: 0 | DISCHARGE
Start: 2020-11-19 | End: 2020-11-20

## 2020-11-19 RX ORDER — DONEPEZIL HYDROCHLORIDE 10 MG/1
10 TABLET, FILM COATED ORAL AT BEDTIME
Qty: 30 TABLET | Refills: 1 | DISCHARGE
Start: 2020-12-24 | End: 2020-11-20

## 2020-11-19 RX ADMIN — LEVOTHYROXINE SODIUM 150 MCG: 75 TABLET ORAL at 06:33

## 2020-11-19 RX ADMIN — CARBOXYMETHYLCELLULOSE SODIUM 1 DROP: 10 GEL OPHTHALMIC at 21:43

## 2020-11-19 RX ADMIN — DONEPEZIL HYDROCHLORIDE 5 MG: 5 TABLET, FILM COATED ORAL at 21:42

## 2020-11-19 RX ADMIN — ACETAMINOPHEN 650 MG: 325 TABLET, FILM COATED ORAL at 08:55

## 2020-11-19 RX ADMIN — FUROSEMIDE 40 MG: 40 TABLET ORAL at 08:41

## 2020-11-19 RX ADMIN — CHOLECALCIFEROL TAB 125 MCG (5000 UNIT) 125 MCG: 125 TAB at 08:41

## 2020-11-19 RX ADMIN — MULTIPLE VITAMINS W/ MINERALS TAB 1 TABLET: TAB at 08:41

## 2020-11-19 RX ADMIN — MICONAZOLE NITRATE: 20 POWDER TOPICAL at 21:44

## 2020-11-19 RX ADMIN — SERTRALINE HYDROCHLORIDE 200 MG: 100 TABLET ORAL at 08:41

## 2020-11-19 RX ADMIN — MICONAZOLE NITRATE: 20 POWDER TOPICAL at 08:42

## 2020-11-19 RX ADMIN — CARBOXYMETHYLCELLULOSE SODIUM 1 DROP: 10 GEL OPHTHALMIC at 08:42

## 2020-11-19 RX ADMIN — BUPROPION HYDROCHLORIDE 450 MG: 150 TABLET, EXTENDED RELEASE ORAL at 08:41

## 2020-11-19 RX ADMIN — ATENOLOL 150 MG: 50 TABLET ORAL at 08:41

## 2020-11-19 NOTE — PLAN OF CARE
Date/Time 11/19 7-3:30pm    Service: Trauma  Behavior/Aggression tool color: Green  Diagnosis: Increased confusion, frequent falls, non-displaced L wrist fx  POD#: NA  Mental Status: AxOx4 - forgetful at times  Activity/dangle: A1 platform walker   Diet: Regular  Pain: denies  [place]/Voiding: BR   Tele/Restraints/Iso:   02/LDA: RAJOSE ALFREDO  D/C Date: TBD - pending insurance authorization ([employer])  Other Info: Encouraged pt to ambulate to and form BR.

## 2020-11-19 NOTE — PROGRESS NOTES
-diagnostic tests and consults completed and resulted: MET  -vital signs normal or at patient baseline: MET    A/Ox4. VSS on ra. CMS intact. Up with A1 the bathroom. Brace on L wrist, NWB LUE. Denies pain. Reg diet. Will continue to monitor.

## 2020-11-19 NOTE — PLAN OF CARE
Date/Time 11/18 3-11:30pm    Service: Trauma  Behavior/Aggression tool color: Green  Diagnosis: Increased confusion, frequent falls, non-displaced L wrist fx  POD#: NA  Mental Status: AxOx4 - forgetful at times  Activity/dangle: A1 platform walker   Diet: Regular  Pain: denies  [place]/Voiding: BR   Tele/Restraints/Iso:   02/LDA: RAJOSE ALFREDO  D/C Date: TBD - pending insurance authorization ([employer])  Other Info: Encouraged pt to ambulate to and form BR and also walked around the nursing station today x2

## 2020-11-19 NOTE — PROGRESS NOTES
Care Management Follow Up    Length of Stay (days): 0    Expected Discharge Date: 11/19/20     Concerns to be Addressed: cognitive/perceptual;decision making;home safety     Patient plan of care discussed at interdisciplinary rounds: Yes    Anticipated Discharge Disposition:       Anticipated Discharge Services:    Anticipated Discharge DME:      Patient/family educated on Medicare website which has current facility and service quality ratings: yes  Education Provided on the Discharge Plan:    Patient/Family in Agreement with the Plan:      Referrals Placed by CM/SW:    Private pay costs discussed: Not applicable    Additional Information:  Call placed to [facility] with patient's senior living regarding services. Patient's sister Catherine called and left a message. Writer spoke to for permission to call her back which patient agreed. Call back to Catherine and message left.     1530: Call placed to patient's sister Catherine. Discussed at length private pay options for TCU, 24 hour care or going home with increased services. Also discussed options including memory care as Catherine has concerns for patient's safety going forward.    Call placed to [facility] and writer was told that there is an opening in memory care there and they thought it would be a good fit as it would be an all ladies floor.  Call placed to [facility] to see if they still have an available bed for patient. Writer was told that patient would be put on the LTC observation unit and would likely see therapy 3 times per week. Writer was also told that if family wanted to pursue LTC going forward, they would be able to work with family for placement there.    This was relayed to Catherine. She returned the call at 1530 and informed writer that the family and patient has agreed that private pay for TCU at [facility] is the direction they would like to go. Writer explained that she would need to contact  and complete their paperwork/fee  and then we can move forward with the discharge. Writer marcial Altamirano Yazidi of decision. Catherine also reported that she will continue to attempt an appeal as she reports they have no appeal on file from patient.     Will continue to follow    Mireille Pires [facility]  Inpatient   [facility]

## 2020-11-19 NOTE — PROGRESS NOTES
[facility]  Hospitalist Progress Note  Kash Diaz MD  11/19/2020    Assessment & Plan   Acute Metabolic Encephalopathy most likely secondary to Seroquel overdose: now resolved.    Delusions and hallucinations: resolved   Of note patient has been having increasing falls and increasing levels of hallucinations, paranoia, and cognitive decline as per report, she is on high doses of Seroquel 150 mg at night and 50 mg every morning.   Presented with fall. There was concern for Lewy body dementia and Parkinson's. Neurology was consulted.   -- she is awake and alert, oriented X 3, reasonable memory, no resting tremor, no rigidity.  -- her symptoms most likely related to Seroquel. No hallucination or paranoia at this time.   --  keep holding her Seroquel at this time and will discontinue at discharge. PT and OT to evaluate.   Neurology following appreciate input. PT is recommending TCU at this time.   SW for disposition. Neurology has started her on Aricept tolerating it well.   [employer] declined TCU placement  Discharge home with home therapies     Dementia  Possible Lewy body   OT cognitive assessment SLUMS shows score of 16 only. (1-20 suggest Dementia)  She is started on Aricept agree with that.   Avoid antipsychotic      Fall with left distal radial fracture  - in wrist splint, need 6 week of splint.   Orthopedic consulted, appreciate input.   NWB to left Upper extremity.   Follow up with orthopedic as out patient.      Hypothyroidism  - resume levothyroxine, TSH check elevated, increase Levothyroxine to 150 mcg daily and recheck  TSH in 4 weeks.      HTN  - continue home atenolol     Depression  - sertaline     Awaiting placement, overall remain stable at this time.      Diet:   regular  DVT Prophylaxis: Pneumatic Compression Devices  Code Status: No CPR- Do NOT Intubate     Disposition:   -- now planning for possible TCU with family paying    Interval History   -- no acute overnight  events  -- SW notes reviewed    -Data reviewed today: I reviewed all new labs and imaging over the last 24 hours. I personally reviewed no images or EKG's today.    Physical Exam    , Blood pressure 134/58, pulse 59, temperature 98  F (36.7  C), temperature source Oral, resp. rate 15, SpO2 93 %.  There were no vitals filed for this visit.  Vital Signs with Ranges  Temp:  [97.9  F (36.6  C)-98.1  F (36.7  C)] 98  F (36.7  C)  Pulse:  [56-59] 59  Resp:  [15-16] 15  BP: (134-157)/(58-74) 134/58  SpO2:  [93 %-96 %] 93 %  I/O's Last 24 hours  No intake/output data recorded.    Constitutional: Awake, alert, cooperative, no apparent distress  Respiratory: Clear to auscultation bilaterally, no crackles or wheezing  Cardiovascular: Regular rate and rhythm, normal S1 and S2, and no murmur noted  GI: Normal bowel sounds, soft, non-distended, non-tender  Skin/Integumen: No rashes, no cyanosis, no edema  Other:      Medications   All medications were reviewed.      atenolol  150 mg Oral Daily     buPROPion  450 mg Oral QAM     carboxymethylcellulose PF  1 drop Left Eye BID     clotrimazole   Topical BID     clotrimazole-betamethasone   Topical BID     donepezil  5 mg Oral At Bedtime     fluconazole  150 mg Oral Once per day on Wed     furosemide  40 mg Oral Daily     levothyroxine  150 mcg Oral QAM AC     miconazole   Topical BID     multivitamin w/minerals  1 tablet Oral Daily     polyethylene glycol  17 g Oral Daily     senna-docusate  1 tablet Oral BID    Or     senna-docusate  2 tablet Oral BID     sertraline  200 mg Oral Daily     triamcinolone   Topical TID     Vitamin D3  125 mcg Oral Daily        Data   Recent Labs   Lab 11/13/20  0857      POTASSIUM 3.5   CHLORIDE 106   CO2 26   BUN 19   CR 1.11*   ANIONGAP 7   PRASHANTH 9.4   GLC 99       No results found for this or any previous visit (from the past 24 hour(s)).    Kash Diaz MD  Text Page  (7am to 6pm)

## 2020-11-20 ENCOUNTER — APPOINTMENT (OUTPATIENT)
Dept: PHYSICAL THERAPY | Facility: CLINIC | Age: 75
End: 2020-11-20
Payer: COMMERCIAL

## 2020-11-20 VITALS
HEART RATE: 58 BPM | SYSTOLIC BLOOD PRESSURE: 149 MMHG | DIASTOLIC BLOOD PRESSURE: 67 MMHG | OXYGEN SATURATION: 93 % | TEMPERATURE: 98.1 F | RESPIRATION RATE: 16 BRPM

## 2020-11-20 PROCEDURE — 250N000013 HC RX MED GY IP 250 OP 250 PS 637: Performed by: INTERNAL MEDICINE

## 2020-11-20 PROCEDURE — G0378 HOSPITAL OBSERVATION PER HR: HCPCS

## 2020-11-20 PROCEDURE — 99217 PR OBSERVATION CARE DISCHARGE: CPT | Performed by: INTERNAL MEDICINE

## 2020-11-20 PROCEDURE — 250N000013 HC RX MED GY IP 250 OP 250 PS 637: Performed by: HOSPITALIST

## 2020-11-20 PROCEDURE — 97116 GAIT TRAINING THERAPY: CPT | Mod: GP | Performed by: PHYSICAL THERAPIST

## 2020-11-20 PROCEDURE — 99207 PR CDG-CODE CATEGORY CHANGED: CPT | Performed by: INTERNAL MEDICINE

## 2020-11-20 RX ORDER — DONEPEZIL HYDROCHLORIDE 10 MG/1
10 TABLET, FILM COATED ORAL AT BEDTIME
Qty: 30 TABLET | Refills: 1 | Status: SHIPPED | OUTPATIENT
Start: 2020-12-24

## 2020-11-20 RX ORDER — DONEPEZIL HYDROCHLORIDE 5 MG/1
5 TABLET, FILM COATED ORAL AT BEDTIME
Qty: 28 TABLET | Refills: 0 | Status: SHIPPED | OUTPATIENT
Start: 2020-11-20

## 2020-11-20 RX ORDER — DONEPEZIL HYDROCHLORIDE 5 MG/1
5 TABLET, FILM COATED ORAL AT BEDTIME
Qty: 28 TABLET | Refills: 0 | Status: SHIPPED | OUTPATIENT
Start: 2020-11-20 | End: 2020-11-20

## 2020-11-20 RX ADMIN — SERTRALINE HYDROCHLORIDE 200 MG: 100 TABLET ORAL at 08:46

## 2020-11-20 RX ADMIN — CLOTRIMAZOLE AND BETAMETHASONE DIPROPIONATE: 10; .5 CREAM TOPICAL at 08:49

## 2020-11-20 RX ADMIN — CLOTRIMAZOLE: 1 CREAM TOPICAL at 08:49

## 2020-11-20 RX ADMIN — CARBOXYMETHYLCELLULOSE SODIUM 1 DROP: 10 GEL OPHTHALMIC at 08:46

## 2020-11-20 RX ADMIN — TRIAMCINOLONE ACETONIDE: 1 CREAM TOPICAL at 08:49

## 2020-11-20 RX ADMIN — BUPROPION HYDROCHLORIDE 450 MG: 150 TABLET, EXTENDED RELEASE ORAL at 08:46

## 2020-11-20 RX ADMIN — MICONAZOLE NITRATE: 20 POWDER TOPICAL at 08:49

## 2020-11-20 RX ADMIN — MULTIPLE VITAMINS W/ MINERALS TAB 1 TABLET: TAB at 08:45

## 2020-11-20 RX ADMIN — ATENOLOL 150 MG: 50 TABLET ORAL at 08:46

## 2020-11-20 RX ADMIN — CHOLECALCIFEROL TAB 125 MCG (5000 UNIT) 125 MCG: 125 TAB at 08:46

## 2020-11-20 RX ADMIN — LEVOTHYROXINE SODIUM 150 MCG: 75 TABLET ORAL at 06:40

## 2020-11-20 RX ADMIN — FUROSEMIDE 40 MG: 40 TABLET ORAL at 08:45

## 2020-11-20 NOTE — DISCHARGE SUMMARY
[facility]  Discharge Summary        Fern Simons MRN# [id number]   YOB: 1945 Age: 75 year old     Date of Admission:  11/10/2020  Date of Discharge:  11/20/2020  Admitting Physician:  No admitting provider for patient encounter.  Discharge Physician: Kash Diaz MD  Discharging Service: Hospitalist     Primary Provider:  Adeola Quezaad  Primary Care Physician Phone Number: [phone]         Discharge Diagnoses/Problem Oriented Hospital Course (Providers):    Fern Simons was admitted on 11/10/2020 by No admitting provider for patient encounter. and I would refer you to their history and physical.  The following problems were addressed during her hospitalization:    Acute Metabolic Encephalopathy most likely secondary to Seroquel overdose: now resolved.    Delusions and hallucinations: resolved   Of note patient has been having increasing falls and increasing levels of hallucinations, paranoia, and cognitive decline as per report, she is on high doses of Seroquel 150 mg at night and 50 mg every morning.   Presented with fall. There was concern for Lewy body dementia and Parkinson's. Neurology was consulted.   -- she is awake and alert, oriented X 3, reasonable memory, no resting tremor, no rigidity.  -- her symptoms most likely related to Seroquel. No hallucination or paranoia at this time.   -- discontinue Seroquel    Neurology following appreciate input. PT is recommending TCU at this time.   SW for disposition. Neurology has started her on Aricept tolerating it well.   [facility] declined TCU placement after multiple attempts.  Discharge home with home therapies     Dementia  Possible Lewy body   OT cognitive assessment SLUMS shows score of 16 only. (1-20 suggest Dementia)  She is started on Aricept agree with that, 5 mg for 30 days before titration up to 10 mg daily.  Avoid antipsychotic      Fall with left distal radial fracture  - in wrist splint, need  Dapsone Pregnancy And Lactation Text: This medication is Pregnancy Category C and is not considered safe during pregnancy or breast feeding. 6 week of splint.   Orthopedic consulted, appreciate input.   NWB to left Upper extremity.   Follow up with orthopedic as out patient.      Hypothyroidism  - resume levothyroxine, TSH check elevated, increase Levothyroxine to 150 mcg daily and recheck  TSH in 4 weeks.      HTN  - continue home atenolol     Depression  - sertaline            Code Status:      DNR / DNI         Important Results:      NAD  HEENT NORMAL  PULM CTA  CV RRR  GI SOFT +BS  MS NO EDEMA  NEURO NON FOCAL  DERM WARM AND DRY         Pending Results:        Unresulted Labs Ordered in the Past 30 Days of this Admission     No orders found from 10/11/2020 to 11/11/2020.               Discharge Instructions and Follow-Up:      Follow-up Appointments     Follow Up and recommended labs and tests      Follow up with primary care provider after discharge from TCU                  Discharge Disposition:      Discharged to home         Discharge Medications:        Current Discharge Medication List      START taking these medications    Details   !! donepezil (ARICEPT) 10 MG tablet Take 1 tablet (10 mg) by mouth At Bedtime  Qty: 30 tablet, Refills: 1    Comments: To be started after completing 28 days of aricept 5 mg daily  Associated Diagnoses: Dementia without behavioral disturbance, unspecified dementia type (H)      !! donepezil (ARICEPT) 5 MG tablet Take 1 tablet (5 mg) by mouth At Bedtime  Qty: 28 tablet, Refills: 0    Associated Diagnoses: Dementia without behavioral disturbance, unspecified dementia type (H)       !! - Potential duplicate medications found. Please discuss with provider.      CONTINUE these medications which have NOT CHANGED    Details   atenolol (TENORMIN) 50 MG tablet Take 150 mg by mouth daily       buPROPion (WELLBUTRIN XL) 150 MG 24 hr tablet Take 450 mg by mouth every morning       clotrimazole (LOTRIMIN) 1 % external cream Apply topically 2 times daily candidial intertrigo      clotrimazole-betamethasone (LOTRISONE) 1-0.05 %  Erythromycin Pregnancy And Lactation Text: This medication is Pregnancy Category B and is considered safe during pregnancy. It is also excreted in breast milk. High Dose Vitamin A Pregnancy And Lactation Text: High dose vitamin A therapy is contraindicated during pregnancy and breast feeding. external cream Apply topically 2 times daily candinitis      cycloSPORINE (RESTASIS) 0.05 % ophthalmic emulsion Place 1 drop Into the left eye 2 times daily      fluconazole (DIFLUCAN) 150 MG tablet Take 150 mg by mouth once a week On Wednesdays.  Ending 11/25/2020      furosemide (LASIX) 40 MG tablet Take 40 mg by mouth daily      levothyroxine (SYNTHROID/LEVOTHROID) 125 MCG tablet Take 125 mcg by mouth every morning (before breakfast)      MAGNESIUM CITRATE PO Take 375 mg by mouth daily as needed (constipation)      melatonin 3 MG tablet Take 1 mg by mouth At Bedtime      multivitamin w/minerals (THERA-VIT-M) tablet Take 1 tablet by mouth daily      nystatin (MYCOSTATIN) 152926 UNIT/GM external powder Apply topically 2 times daily Skin breakdown      sertraline (ZOLOFT) 100 MG tablet Take 200 mg by mouth daily      triamcinolone (KENALOG) 0.1 % external cream Apply topically 3 times daily Candidal intertrigo      Vitamin D3 (CHOLECALCIFEROL) 125 MCG (5000 UT) tablet Take 125 mcg by mouth daily         STOP taking these medications       QUEtiapine (SEROQUEL) 100 MG tablet Comments:   Reason for Stopping:         QUEtiapine (SEROQUEL) 50 MG tablet Comments:   Reason for Stopping:                    Allergies:         Allergies   Allergen Reactions     Ciprofloxacin      Sulfa Drugs             Consultations This Hospital Stay:      Consultation during this admission received from neurology          Discharge Orders for Skilled Facility (from Discharge Orders):        After Care Instructions     Activity - Up with nursing assistance      Advance Diet as Tolerated      Follow this diet upon discharge: Orders Placed This Encounter      Regular Diet Adult         General info for SNF      Length of Stay Estimate: Short Term Care: Estimated # of Days <30  Condition at Discharge: Improving  Level of care:skilled   Rehabilitation Potential: Good  Admission H&P remains valid and up-to-date: Yes  Recent Chemotherapy:  N/A  Use Nursing Home Standing Orders: N/A         Mantoux instructions      Give two-step Mantoux (PPD) Per Facility Policy Yes                    Rehab orders for Skilled Facility (from Discharge Orders):      Referrals     Future Labs/Procedures    Home care nursing referral     Comments:    RN extended hours visit. RN to assess vital signs and weight.    Your provider has ordered home care nursing services. If you have not been   contacted within 2 days of your discharge please call the inpatient   department phone number at [phone] .    Patient will also need Home Health Aid.    Home Care OT Referral for Hospital Discharge     Comments:    OT to eval and treat    Your provider has ordered home care - occupational therapy. If you have   not been contacted within 2 days of your discharge please call the   department phone number listed on the top of this document.    Home Care PT Referral for Hospital Discharge     Comments:    PT to eval and treat    Your provider has ordered home care - physical therapy. If you have not   been contacted within 2 days of your discharge please call the department   phone number listed on the top of this document.    Occupational Therapy Adult Consult     Comments:    Evaluate and treat as clinically indicated.    Reason:  seroquel intoxication    Physical Therapy Adult Consult     Comments:    Evaluate and treat as clinically indicated.    Reason:  weakness             Discharge Time:      less than 30 minutes.        Image Results From This Hospital Stay (For Non-EPIC Providers):        Results for orders placed or performed during the hospital encounter of 11/10/20   CT Head w/o Contrast    Narrative    CT OF THE HEAD WITHOUT CONTRAST 11/10/2020 11:21 AM     COMPARISON: Head CT 8/18/2020    HISTORY: Fall, right forehead trauma, AMS with nonfocal exam.    TECHNIQUE: 5 mm thick axial CT images of the head were acquired  without IV contrast material.    FINDINGS: There is  Spironolactone Pregnancy And Lactation Text: This medication can cause feminization of the male fetus and should be avoided during pregnancy. The active metabolite is also found in breast milk. Benzoyl Peroxide Pregnancy And Lactation Text: This medication is Pregnancy Category C. It is unknown if benzoyl peroxide is excreted in breast milk. moderate diffuse cerebral volume loss. There are  subtle patchy areas of decreased density in the cerebral white matter  bilaterally that are consistent with sequela of chronic small vessel  ischemic disease.    The ventricles and basal cisterns are within normal limits in  configuration given the degree of cerebral volume loss. There is no  midline shift. There are no extra-axial fluid collections.    No intracranial hemorrhage, mass or recent infarct.    The visualized paranasal sinuses are well-aerated. There is no  mastoiditis. There are no fractures of the visualized bones.      Impression    IMPRESSION: Diffuse cerebral volume loss and cerebral white matter  changes consistent with chronic small vessel ischemic disease. No  evidence for acute intracranial pathology.      Radiation dose for this scan was reduced using automated exposure  control, adjustment of the mA and/or kV according to patient size, or  iterative reconstruction technique.    TAMIKA JIMENEZ MD   XR Chest 2 Views    Narrative    XR CHEST 2 VW  11/10/2020 12:01 PM       INDICATION: fall, weakness, no fever/cough  COMPARISON: 12/28/2019       Impression    IMPRESSION: Mild cardiomegaly. Otherwise negative chest.    MED WALKER MD   Radius/Ulna XR,  PA &LAT, left    Narrative    LEFT FOREARM TWO VIEWS, LEFT WRIST THREE OR MORE VIEWS 11/10/2020  12:02 PM     HISTORY: Fall with acute pain.    FINDINGS: Mild thumb CMC degenerative arthrosis.      Impression    IMPRESSION: Distal radius fracture. There is minimal displacement at  the dorsal aspect of the fracture.    HANNAH HARVEY MD   XR Wrist Left G/E 3 Views    Narrative    LEFT FOREARM TWO VIEWS, LEFT WRIST THREE OR MORE VIEWS 11/10/2020  12:02 PM     HISTORY: Fall with acute pain.    FINDINGS: Mild thumb CMC degenerative arthrosis.      Impression    IMPRESSION: Distal radius fracture. There is minimal displacement at  the dorsal aspect of the fracture.    HANNAH HARVEY MD   XR Knee Right 3  Benzoyl Peroxide Counseling: Patient counseled that medicine may cause skin irritation and bleach clothing.  In the event of skin irritation, the patient was advised to reduce the amount of the drug applied or use it less frequently.   The patient verbalized understanding of the proper use and possible adverse effects of benzoyl peroxide.  All of the patient's questions and concerns were addressed. Views    Narrative    TIBIA AND FIBULA RIGHT TWO VIEWS, KNEE RIGHT THREE VIEWS   11/10/2020  12:04 PM     HISTORY: Fall with acute pain.    FINDINGS: Patellofemoral and lateral knee compartment osteoarthritis.      Impression    IMPRESSION: No fracture identified.    HANNAH HARVEY MD   XR Tibia & Fibula Right 2 Views    Narrative    TIBIA AND FIBULA RIGHT TWO VIEWS, KNEE RIGHT THREE VIEWS   11/10/2020  12:04 PM     HISTORY: Fall with acute pain.    FINDINGS: Patellofemoral and lateral knee compartment osteoarthritis.      Impression    IMPRESSION: No fracture identified.    HANNAH HARVEY MD           Most Recent Lab Results In EPIC (For Non-EPIC Providers):    Most Recent 3 CBC's:  Recent Labs   Lab Test 11/11/20  0557 11/10/20  1057 12/28/19  1222   WBC 5.8 5.3 9.0   HGB 12.1 12.0 12.7    100 100   * 126* 130*      Most Recent 3 BMP's:  Recent Labs   Lab Test 11/13/20  0857 11/12/20  0701 11/10/20  1057    138 137   POTASSIUM 3.5 3.2* 3.4   CHLORIDE 106 104 104   CO2 26 29 31   BUN 19 23 26   CR 1.11* 1.24* 1.22*   ANIONGAP 7 5 2*   PRASHANTH 9.4 9.0 9.2   GLC 99 88 99     Most Recent 3 Troponin's:  Recent Labs   Lab Test 11/10/20  1057 12/28/19  1222   TROPI <0.015 <0.015     Most Recent 3 INR's:  Recent Labs   Lab Test 12/28/19  1222   INR 0.97     Most Recent 2 LFT's:  Recent Labs   Lab Test 11/11/20  0557 11/10/20  1057   AST 17 16   ALT 22 20   ALKPHOS 83 82   BILITOTAL 0.9 0.6     Most Recent Cholesterol Panel:No lab results found.  Most Recent 6 Bacteria Isolates From Any Culture (See EPIC Reports for Culture Details):No lab results found.  Most Recent TSH, T4 and HgbA1c:  Recent Labs   Lab Test 11/12/20  0701   TSH 12.14*   T4 0.92         Tazorac Counseling:  Patient advised that medication is irritating and drying.  Patient may need to apply sparingly and wash off after an hour before eventually leaving it on overnight.  The patient verbalized understanding of the proper use and possible adverse effects of tazorac.  All of the patient's questions and concerns were addressed. Topical Sulfur Applications Counseling: Topical Sulfur Counseling: Patient counseled that this medication may cause skin irritation or allergic reactions.  In the event of skin irritation, the patient was advised to reduce the amount of the drug applied or use it less frequently.   The patient verbalized understanding of the proper use and possible adverse effects of topical sulfur application.  All of the patient's questions and concerns were addressed. Bactrim Pregnancy And Lactation Text: This medication is Pregnancy Category D and is known to cause fetal risk.  It is also excreted in breast milk. Birth Control Pills Counseling: Birth Control Pill Counseling: I discussed with the patient the potential side effects of OCPs including but not limited to increased risk of stroke, heart attack, thrombophlebitis, deep venous thrombosis, hepatic adenomas, breast changes, GI upset, headaches, and depression.  The patient verbalized understanding of the proper use and possible adverse effects of OCPs. All of the patient's questions and concerns were addressed. Doxycycline Counseling:  Patient counseled regarding possible photosensitivity and increased risk for sunburn.  Patient instructed to avoid sunlight, if possible.  When exposed to sunlight, patients should wear protective clothing, sunglasses, and sunscreen.  The patient was instructed to call the office immediately if the following severe adverse effects occur:  hearing changes, easy bruising/bleeding, severe headache, or vision changes.  The patient verbalized understanding of the proper use and possible adverse effects of doxycycline.  All of the patient's questions and concerns were addressed. Minocycline Counseling: Patient advised regarding possible photosensitivity and discoloration of the teeth, skin, lips, tongue and gums.  Patient instructed to avoid sunlight, if possible.  When exposed to sunlight, patients should wear protective clothing, sunglasses, and sunscreen.  The patient was instructed to call the office immediately if the following severe adverse effects occur:  hearing changes, easy bruising/bleeding, severe headache, or vision changes.  The patient verbalized understanding of the proper use and possible adverse effects of minocycline.  All of the patient's questions and concerns were addressed. Topical Sulfur Applications Pregnancy And Lactation Text: This medication is Pregnancy Category C and has an unknown safety profile during pregnancy. It is unknown if this topical medication is excreted in breast milk. Tazorac Pregnancy And Lactation Text: This medication is not safe during pregnancy. It is unknown if this medication is excreted in breast milk. Isotretinoin Counseling: Patient should get monthly blood tests, not donate blood, not drive at night if vision affected, not share medication, and not undergo elective surgery for 6 months after tx completed. Side effects reviewed, pt to contact office should one occur. Doxycycline Pregnancy And Lactation Text: This medication is Pregnancy Category D and not consider safe during pregnancy. It is also excreted in breast milk but is considered safe for shorter treatment courses. Isotretinoin Pregnancy And Lactation Text: This medication is Pregnancy Category X and is considered extremely dangerous during pregnancy. It is unknown if it is excreted in breast milk. Minocycline Pregnancy And Lactation Text: This medication is Pregnancy Category D and not consider safe during pregnancy. It is also excreted in breast milk. Topical Retinoid Pregnancy And Lactation Text: This medication is Pregnancy Category C. It is unknown if this medication is excreted in breast milk. Tetracycline Counseling: Patient counseled regarding possible photosensitivity and increased risk for sunburn.  Patient instructed to avoid sunlight, if possible.  When exposed to sunlight, patients should wear protective clothing, sunglasses, and sunscreen.  The patient was instructed to call the office immediately if the following severe adverse effects occur:  hearing changes, easy bruising/bleeding, severe headache, or vision changes.  The patient verbalized understanding of the proper use and possible adverse effects of tetracycline.  All of the patient's questions and concerns were addressed. Patient understands to avoid pregnancy while on therapy due to potential birth defects. Topical Retinoid counseling:  Patient advised to apply a pea-sized amount only at bedtime and wait 30 minutes after washing their face before applying.  If too drying, patient may add a non-comedogenic moisturizer. The patient verbalized understanding of the proper use and possible adverse effects of retinoids.  All of the patient's questions and concerns were addressed. Topical Clindamycin Counseling: Patient counseled that this medication may cause skin irritation or allergic reactions.  In the event of skin irritation, the patient was advised to reduce the amount of the drug applied or use it less frequently.   The patient verbalized understanding of the proper use and possible adverse effects of clindamycin.  All of the patient's questions and concerns were addressed. Include Pregnancy/Lactation Warning?: No Detail Level: Detailed Azithromycin Pregnancy And Lactation Text: This medication is considered safe during pregnancy and is also secreted in breast milk. Birth Control Pills Pregnancy And Lactation Text: This medication should be avoided if pregnant and for the first 30 days post-partum. Bactrim Counseling:  I discussed with the patient the risks of sulfa antibiotics including but not limited to GI upset, allergic reaction, drug rash, diarrhea, dizziness, photosensitivity, and yeast infections.  Rarely, more serious reactions can occur including but not limited to aplastic anemia, agranulocytosis, methemoglobinemia, blood dyscrasias, liver or kidney failure, lung infiltrates or desquamative/blistering drug rashes. Dapsone Counseling: I discussed with the patient the risks of dapsone including but not limited to hemolytic anemia, agranulocytosis, rashes, methemoglobinemia, kidney failure, peripheral neuropathy, headaches, GI upset, and liver toxicity.  Patients who start dapsone require monitoring including baseline LFTs and weekly CBCs for the first month, then every month thereafter.  The patient verbalized understanding of the proper use and possible adverse effects of dapsone.  All of the patient's questions and concerns were addressed. High Dose Vitamin A Counseling: Side effects reviewed, pt to contact office should one occur. Spironolactone Counseling: Patient advised regarding risks of diarrhea, abdominal pain, hyperkalemia, birth defects (for female patients), liver toxicity and renal toxicity. The patient may need blood work to monitor liver and kidney function and potassium levels while on therapy. The patient verbalized understanding of the proper use and possible adverse effects of spironolactone.  All of the patient's questions and concerns were addressed. Azithromycin Counseling:  I discussed with the patient the risks of azithromycin including but not limited to GI upset, allergic reaction, drug rash, diarrhea, and yeast infections. Erythromycin Counseling:  I discussed with the patient the risks of erythromycin including but not limited to GI upset, allergic reaction, drug rash, diarrhea, increase in liver enzymes, and yeast infections. Topical Clindamycin Pregnancy And Lactation Text: This medication is Pregnancy Category B and is considered safe during pregnancy. It is unknown if it is excreted in breast milk.

## 2020-11-20 NOTE — PROGRESS NOTES
-diagnostic tests and consults completed and resulted : goal met  -vital signs normal or at patient baseline : goal met      Pt alert and oriented - forgetful at times, up with 1 gb and platform walker, L wrist splinted - NWB, reg diet, voiding adequately in BR, rash in mere area and under skin folds - improving, discharge pending - insurance issues.

## 2020-11-20 NOTE — PROGRESS NOTES
-diagnostic tests and consults completed and resulted : goal met  -vital signs normal or at patient baseline : goal met

## 2020-11-20 NOTE — PROGRESS NOTES
Care Management Follow Up    Length of Stay (days): 0    Expected Discharge Date: 11/20/20     Concerns to be Addressed: cognitive/perceptual;decision making;home safety     Patient plan of care discussed at interdisciplinary rounds: Yes    Anticipated Discharge Disposition:       Anticipated Discharge Services:    Anticipated Discharge DME:      Patient/family educated on Medicare website which has current facility and service quality ratings: yes  Education Provided on the Discharge Plan:    Patient/Family in Agreement with the Plan:      Referrals Placed by CM/SW:    Private pay costs discussed: Not applicable    Additional Information:  Call placed to [facility] regarding patient privately paying for TCU. Was informed that no one contacted them regarding this placement and there is no bed available.    Call received from Catherine, patient's sister. Call returned. Catherine reported that she spoke to her brother, who is the POA and they don't think that privately paying is an option for long term. Writer expressed that in the meantime the family may need to increase services at the senior apartment and look for long term options from there. Writer also explained that patient needs to be discharge as medically she is ready and continues to be in conversation status. Catherine will contact [facility] to arrange. Writer also reported that as of now transportation may be booked well into the evening so if they have someone who could pick the patient up that would allow her to return earlier. Catherine mentioned a 'helper' named Manda and will reach out to her .     Will continue to follow    Mireille Pires [facility]  Inpatient   [facility]

## 2020-11-20 NOTE — PLAN OF CARE
A/OX3,forgetful .Up with 1 assist/walker.Left wrist splint in place.Denies pain.Voiding well.Pt is discharging home today at 1500 with .home based PT/OT, RN and HHA.IV removed. 1500: All discharge medications and belongings returned to the pt.

## 2020-11-20 NOTE — PROGRESS NOTES
Care Management Discharge Note    Discharge Date: 11/20/20  Expected Time of Departure: Wheelchair with [facility] 1500    Discharge Disposition: Home Care    Discharge Services: Facility uses [facility] services. Writer informed that they would set up home based PT/OT, RN and HHA.       Discharge DME:      Discharge Transportation: health plan transportation    Private pay costs discussed: transportation costs    PAS Confirmation Code:    Patient/family educated on Medicare website which has current facility and service quality ratings: yes    Education Provided on the Discharge Plan:    Persons Notified of Discharge Plans: yes  Patient/Family in Agreement with the Plan: yes    Handoff Referral Completed: Yes    Additional Information:  Orders received for discharge today. Writer spoke to patient's sister regarding discharge plans. After she discussed with patient's brother, it was decided that patient would discharge home with additional services. Orders included home PT/OT, HHA and RN. Facility will send orders to [facility]. Writer contacted facility to verify added services and it was confirmed. She can return today. Spoke to patient's sister regarding transport and a ride was requested. Spoke to patient about potential costs and she is in agreement. Orders faxed to facility. Patient and sister both in agreement for plans. No PAS needed.     MEJIA Davis  Inpatient   [facility]

## 2020-11-20 NOTE — PLAN OF CARE
Physical Therapy Discharge Summary    Reason for therapy discharge:    Discharged to home with home therapy.    Progress towards therapy goal(s). See goals on Care Plan in [facility] electronic health record for goal details.  Goals not met.  Barriers to achieving goals:   discharge from facility.    Therapy recommendation(s):    Continued therapy is recommended.  Rationale/Recommendations:  Recommended TCU, noted pt discharged home with Home PT.